# Patient Record
Sex: MALE | Race: WHITE | HISPANIC OR LATINO | Employment: FULL TIME | URBAN - METROPOLITAN AREA
[De-identification: names, ages, dates, MRNs, and addresses within clinical notes are randomized per-mention and may not be internally consistent; named-entity substitution may affect disease eponyms.]

---

## 2021-04-15 ENCOUNTER — OFFICE VISIT (OUTPATIENT)
Dept: FAMILY MEDICINE CLINIC | Facility: CLINIC | Age: 56
End: 2021-04-15
Payer: COMMERCIAL

## 2021-04-15 ENCOUNTER — TELEPHONE (OUTPATIENT)
Dept: ADMINISTRATIVE | Facility: OTHER | Age: 56
End: 2021-04-15

## 2021-04-15 VITALS
HEART RATE: 76 BPM | HEIGHT: 66 IN | BODY MASS INDEX: 29.06 KG/M2 | SYSTOLIC BLOOD PRESSURE: 120 MMHG | TEMPERATURE: 97.9 F | OXYGEN SATURATION: 98 % | WEIGHT: 180.8 LBS | DIASTOLIC BLOOD PRESSURE: 82 MMHG

## 2021-04-15 DIAGNOSIS — Z00.00 HEALTH MAINTENANCE EXAMINATION: Primary | ICD-10-CM

## 2021-04-15 DIAGNOSIS — E66.3 OVERWEIGHT (BMI 25.0-29.9): ICD-10-CM

## 2021-04-15 DIAGNOSIS — Z11.4 ENCOUNTER FOR SCREENING FOR HIV: ICD-10-CM

## 2021-04-15 PROCEDURE — 3008F BODY MASS INDEX DOCD: CPT | Performed by: FAMILY MEDICINE

## 2021-04-15 PROCEDURE — 99386 PREV VISIT NEW AGE 40-64: CPT | Performed by: FAMILY MEDICINE

## 2021-04-15 PROCEDURE — 1036F TOBACCO NON-USER: CPT | Performed by: FAMILY MEDICINE

## 2021-04-15 PROCEDURE — 3725F SCREEN DEPRESSION PERFORMED: CPT | Performed by: FAMILY MEDICINE

## 2021-04-15 RX ORDER — IBUPROFEN 600 MG/1
600 TABLET ORAL EVERY 8 HOURS PRN
COMMUNITY
Start: 2020-11-24 | End: 2021-12-20

## 2021-04-15 NOTE — PATIENT INSTRUCTIONS
Excellent health  No risk factors for heart disease  Will screen fasting sugar and HIV  Suggest getting COVID vaccine when available to him  At some point after that, recommend the shingles vaccine which is 2 shots  Recheck 1 year or as needed

## 2021-04-15 NOTE — TELEPHONE ENCOUNTER
----- Message from Florian Diaz, George Blair sent at 4/15/2021  1:20 PM EDT -----  Regarding: care gap request  04/15/21 1:20 PM    Hello, our patient Sadie Harrison has had CRC: Colonoscopy completed/performed  Please assist in updating the patient chart by pulling the Care Everywhere (CE) document  The date of service is 11/2019       Thank you,  Florian Diaz MA   [facility]

## 2021-04-15 NOTE — PROGRESS NOTES
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  Past Medical History:   Diagnosis Date    Renal colic 13/98/8047    x 2        History reviewed  No pertinent surgical history  Social History     Tobacco Use    Smoking status: Never Smoker    Smokeless tobacco: Never Used   Substance Use Topics    Alcohol use: Yes     Frequency: Monthly or less       Social History     Social History Narrative     since 89  Is a   Wife works as a registrar at [employer]     5 kids  Siobhan Cobbs 30, Working for [employer]  Lives in [facility],   Teacher  Grad of [employer]  Lives in [place]  2 daughters  Moving to [place]   is a   Alexia 29  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  Lives in [facility] in 11th grade  As of 21  [employer] 11  5TH grade  Father, 80, lives with them  He is on dialysis  Doing OK  Enjoys bike riding, camping, fishing, boating  Father  at 76 from [address] in 3/20  The following portions of the patient's history were reviewed and updated as appropriate: allergies, current medications, past family history, past medical history, past social history, past surgical history and problem list       Review of Systems   Constitutional: Negative for activity change and appetite change  HENT: Negative for ear pain and hearing loss  Eyes: Negative for visual disturbance     Respiratory: Negative for shortness of breath and wheezing  Cardiovascular: Negative for chest pain and leg swelling  Gastrointestinal: Negative for abdominal pain, constipation and diarrhea  Genitourinary: Negative for difficulty urinating  Musculoskeletal: Negative for arthralgias and back pain  Skin: Negative for rash  Neurological: Negative for headaches  Psychiatric/Behavioral: Negative for dysphoric mood  The patient is not nervous/anxious  /82 (BP Location: Left arm, Patient Position: Sitting, Cuff Size: Adult)   Pulse 76   Temp 97 9 °F (36 6 °C) (Temporal)   Ht 5' 5 75" (1 67 m)   Wt 82 kg (180 lb 12 8 oz)   SpO2 98%   BMI 29 41 kg/m²      Physical Exam     Healthy appearing individual in no acute distress  Extraocular motions are intact  Both ear drums are white  Hearing is grossly intact  Throat reveals no erythema  Teeth are in good repair  No neck nodes or thyromegaly  Lungs are clear  Heart regular with no murmurs or gallops  Abdomen is soft and nontender  No leg edema  Skin reveals no apparent rash  Neurologic grossly within normal limits  Normal mood and affect  Musculoskeletal exam grossly within normal limits  BMI Counseling: Body mass index is 29 41 kg/m²  The BMI is above normal  Nutrition recommendations include encouraging healthy choices of fruits and vegetables, consuming healthier snacks and limiting drinks that contain sugar  Exercise recommendations include moderate physical activity 150 minutes/week  Current Outpatient Medications:     ibuprofen (MOTRIN) 600 mg tablet, Take 600 mg by mouth every 8 (eight) hours as needed, Disp: , Rfl:      No problem-specific Assessment & Plan notes found for this encounter  Diagnoses and all orders for this visit:    Health maintenance examination    Overweight (BMI 25 0-29 9)  -     Glucose, fasting;  Future    Encounter for screening for HIV  -     HIV 1/2 Antigen/Antibody (4th Generation) w Reflex RUSTYUHN; Future    Other orders  -     ibuprofen (MOTRIN) 600 mg tablet; Take 600 mg by mouth every 8 (eight) hours as needed        Patient Instructions     Excellent health  No risk factors for heart disease  Will screen fasting sugar and HIV  Suggest getting COVID vaccine when available to him  At some point after that, recommend the shingles vaccine which is 2 shots  Recheck 1 year or as needed

## 2021-04-15 NOTE — TELEPHONE ENCOUNTER
----- Message from Jessica Nicole MD sent at 4/15/2021 11:07 AM EDT -----    Please abstract colonoscopy from [facility] data -done in 11/19

## 2021-04-16 NOTE — TELEPHONE ENCOUNTER
Upon review of the In Basket request we were able to locate, review, and update the patient chart as requested for CRC: Colonoscopy  Any additional questions or concerns should be emailed to the Practice Liaisons via [email or web address] email, please do not reply via In Basket      Thank you  Blayne Gross

## 2021-04-23 ENCOUNTER — APPOINTMENT (OUTPATIENT)
Dept: LAB | Facility: CLINIC | Age: 56
End: 2021-04-23
Payer: COMMERCIAL

## 2021-04-23 DIAGNOSIS — R73.03 PREDIABETES: Primary | ICD-10-CM

## 2021-04-23 DIAGNOSIS — E66.3 OVERWEIGHT (BMI 25.0-29.9): ICD-10-CM

## 2021-04-23 DIAGNOSIS — Z11.4 ENCOUNTER FOR SCREENING FOR HIV: ICD-10-CM

## 2021-04-23 LAB — GLUCOSE P FAST SERPL-MCNC: 110 MG/DL (ref 65–99)

## 2021-04-23 PROCEDURE — 87389 HIV-1 AG W/HIV-1&-2 AB AG IA: CPT

## 2021-04-23 PROCEDURE — 82947 ASSAY GLUCOSE BLOOD QUANT: CPT

## 2021-04-23 PROCEDURE — 36415 COLL VENOUS BLD VENIPUNCTURE: CPT

## 2021-04-25 LAB — HIV 1+2 AB+HIV1 P24 AG SERPL QL IA: NORMAL

## 2021-04-27 ENCOUNTER — LAB (OUTPATIENT)
Dept: LAB | Facility: CLINIC | Age: 56
End: 2021-04-27
Payer: COMMERCIAL

## 2021-04-27 ENCOUNTER — IMMUNIZATIONS (OUTPATIENT)
Dept: FAMILY MEDICINE CLINIC | Facility: HOSPITAL | Age: 56
End: 2021-04-27
Payer: COMMERCIAL

## 2021-04-27 DIAGNOSIS — Z23 ENCOUNTER FOR IMMUNIZATION: Primary | ICD-10-CM

## 2021-04-27 DIAGNOSIS — R73.03 PREDIABETES: ICD-10-CM

## 2021-04-27 LAB
EST. AVERAGE GLUCOSE BLD GHB EST-MCNC: 117 MG/DL
HBA1C MFR BLD: 5.7 %

## 2021-04-27 PROCEDURE — 91301 SARS-COV-2 / COVID-19 MRNA VACCINE (MODERNA) 100 MCG: CPT

## 2021-04-27 PROCEDURE — 0011A SARS-COV-2 / COVID-19 MRNA VACCINE (MODERNA) 100 MCG: CPT

## 2021-04-27 PROCEDURE — 36415 COLL VENOUS BLD VENIPUNCTURE: CPT

## 2021-04-27 PROCEDURE — 83036 HEMOGLOBIN GLYCOSYLATED A1C: CPT

## 2021-05-25 ENCOUNTER — IMMUNIZATIONS (OUTPATIENT)
Dept: FAMILY MEDICINE CLINIC | Facility: HOSPITAL | Age: 56
End: 2021-05-25

## 2021-05-25 DIAGNOSIS — Z23 ENCOUNTER FOR IMMUNIZATION: Primary | ICD-10-CM

## 2021-05-25 PROCEDURE — 0012A SARS-COV-2 / COVID-19 MRNA VACCINE (MODERNA) 100 MCG: CPT

## 2021-05-25 PROCEDURE — 91301 SARS-COV-2 / COVID-19 MRNA VACCINE (MODERNA) 100 MCG: CPT

## 2021-06-25 ENCOUNTER — TELEPHONE (OUTPATIENT)
Dept: FAMILY MEDICINE CLINIC | Facility: CLINIC | Age: 56
End: 2021-06-25

## 2021-06-25 NOTE — TELEPHONE ENCOUNTER
Fernando called with complaints of lower right side back pain that started 2-3 days ago  He said yesterday it was a 9 and then got better but today he can barely move  The pain also moves into his hip  I offered him an appointment but he said he is at work and scheduled for Monday  Please advise

## 2021-06-29 ENCOUNTER — OFFICE VISIT (OUTPATIENT)
Dept: FAMILY MEDICINE CLINIC | Facility: CLINIC | Age: 56
End: 2021-06-29
Payer: COMMERCIAL

## 2021-06-29 VITALS
BODY MASS INDEX: 28.25 KG/M2 | WEIGHT: 180 LBS | HEART RATE: 60 BPM | TEMPERATURE: 97.2 F | DIASTOLIC BLOOD PRESSURE: 74 MMHG | HEIGHT: 67 IN | OXYGEN SATURATION: 99 % | SYSTOLIC BLOOD PRESSURE: 120 MMHG | RESPIRATION RATE: 16 BRPM

## 2021-06-29 DIAGNOSIS — M54.50 ACUTE RIGHT-SIDED LOW BACK PAIN WITHOUT SCIATICA: Primary | ICD-10-CM

## 2021-06-29 PROCEDURE — 99213 OFFICE O/P EST LOW 20 MIN: CPT | Performed by: FAMILY MEDICINE

## 2021-06-29 PROCEDURE — 3008F BODY MASS INDEX DOCD: CPT | Performed by: FAMILY MEDICINE

## 2021-06-29 PROCEDURE — 1036F TOBACCO NON-USER: CPT | Performed by: FAMILY MEDICINE

## 2021-06-29 NOTE — PROGRESS NOTES
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  Past Medical History:   Diagnosis Date    Renal colic 39/11/6063    x 2        History reviewed  No pertinent surgical history  Social History     Tobacco Use    Smoking status: Never Smoker    Smokeless tobacco: Never Used   Substance Use Topics    Alcohol use: Yes       Social History     Social History Narrative     since 89  Is a   Wife works as a registrar at [employer]     5 kids  ZeeVee 28, Working for [employer]  Lives in [facility] [place],   Teacher  Grad of [employer]  Lives in [place]  2 daughters  Moving to [place]   is a   Alexia 29  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  Lives in [facility] in 11th grade  As of 21  [employer] 11  5TH grade  Father, 80, lives with them  He is on dialysis  Doing OK  Enjoys bike riding, camping, fishing, boating  Father  at 76 from [address] in 3/20  The following portions of the patient's history were reviewed and updated as appropriate: allergies, current medications, past family history, past medical history, past social history, past surgical history and problem list       Review of Systems   Constitutional: Negative for activity change and appetite change  HENT: Negative for ear pain and hearing loss  Eyes: Negative for visual disturbance     Respiratory: Negative for shortness of breath and wheezing  Cardiovascular: Negative for chest pain and leg swelling  Gastrointestinal: Negative for abdominal pain, constipation and diarrhea  Genitourinary: Negative for difficulty urinating  Musculoskeletal: Positive for back pain  Negative for arthralgias  Skin: Negative for rash  Neurological: Negative for headaches  Psychiatric/Behavioral: Negative for dysphoric mood  The patient is not nervous/anxious  /74   Pulse 60   Temp (!) 97 2 °F (36 2 °C) (Temporal)   Resp 16   Ht 5' 6 54" (1 69 m)   Wt 81 6 kg (180 lb)   SpO2 99%   BMI 28 59 kg/m²      Physical Exam   Appears well  There is no palpable tenderness on his low back or over the pelvic girdle  The area of discomfort is in the right buttock area  No tenderness over the right greater trochanter  Lays down and sits up without difficulty  Straight leg raising is negative although raising the left leg reproduces some discomfort in the right pelvic girdle  Sensation intact to light touch  Current Outpatient Medications:     ibuprofen (MOTRIN) 600 mg tablet, Take 600 mg by mouth every 8 (eight) hours as needed, Disp: , Rfl:      No problem-specific Assessment & Plan notes found for this encounter  Diagnoses and all orders for this visit:    Acute right-sided low back pain without sciatica        Patient Instructions   Fortunately, his about 40-50% improved  Continue ibuprofen 600 mg 3 times a day along with 2 extra-strength Tylenol  He is able to continue working  Return as needed

## 2021-06-29 NOTE — PATIENT INSTRUCTIONS
Fortunately, his about 40-50% improved  Continue ibuprofen 600 mg 3 times a day along with 2 extra-strength Tylenol  He is able to continue working  Return as needed

## 2021-11-09 ENCOUNTER — OFFICE VISIT (OUTPATIENT)
Dept: FAMILY MEDICINE CLINIC | Facility: CLINIC | Age: 56
End: 2021-11-09
Payer: COMMERCIAL

## 2021-11-09 VITALS
HEART RATE: 56 BPM | WEIGHT: 180 LBS | OXYGEN SATURATION: 98 % | DIASTOLIC BLOOD PRESSURE: 80 MMHG | TEMPERATURE: 97.2 F | SYSTOLIC BLOOD PRESSURE: 100 MMHG | BODY MASS INDEX: 28.25 KG/M2 | HEIGHT: 67 IN

## 2021-11-09 DIAGNOSIS — M54.6 ACUTE RIGHT-SIDED THORACIC BACK PAIN: Primary | ICD-10-CM

## 2021-11-09 DIAGNOSIS — M54.50 ACUTE RIGHT-SIDED LOW BACK PAIN WITHOUT SCIATICA: ICD-10-CM

## 2021-11-09 DIAGNOSIS — M62.838 MUSCLE SPASM: ICD-10-CM

## 2021-11-09 LAB
SL AMB  POCT GLUCOSE, UA: NORMAL
SL AMB LEUKOCYTE ESTERASE,UA: NORMAL
SL AMB POCT BILIRUBIN,UA: NORMAL
SL AMB POCT BLOOD,UA: NORMAL
SL AMB POCT CLARITY,UA: CLEAR
SL AMB POCT COLOR,UA: YELLOW
SL AMB POCT KETONES,UA: NORMAL
SL AMB POCT NITRITE,UA: NORMAL
SL AMB POCT PH,UA: 7
SL AMB POCT SPECIFIC GRAVITY,UA: 1010
SL AMB POCT URINE PROTEIN: NORMAL
SL AMB POCT UROBILINOGEN: NORMAL

## 2021-11-09 PROCEDURE — 1036F TOBACCO NON-USER: CPT | Performed by: FAMILY MEDICINE

## 2021-11-09 PROCEDURE — 81002 URINALYSIS NONAUTO W/O SCOPE: CPT | Performed by: FAMILY MEDICINE

## 2021-11-09 PROCEDURE — 3008F BODY MASS INDEX DOCD: CPT | Performed by: FAMILY MEDICINE

## 2021-11-09 PROCEDURE — 99214 OFFICE O/P EST MOD 30 MIN: CPT | Performed by: FAMILY MEDICINE

## 2021-11-09 RX ORDER — METHOCARBAMOL 750 MG/1
750 TABLET, FILM COATED ORAL EVERY 8 HOURS SCHEDULED
Qty: 30 TABLET | Refills: 0 | Status: SHIPPED | OUTPATIENT
Start: 2021-11-09 | End: 2021-12-20 | Stop reason: SDUPTHER

## 2021-12-20 ENCOUNTER — OFFICE VISIT (OUTPATIENT)
Dept: FAMILY MEDICINE CLINIC | Facility: CLINIC | Age: 56
End: 2021-12-20
Payer: COMMERCIAL

## 2021-12-20 VITALS
HEIGHT: 67 IN | DIASTOLIC BLOOD PRESSURE: 82 MMHG | HEART RATE: 78 BPM | SYSTOLIC BLOOD PRESSURE: 120 MMHG | OXYGEN SATURATION: 98 % | TEMPERATURE: 97.1 F | WEIGHT: 181.6 LBS | BODY MASS INDEX: 28.5 KG/M2

## 2021-12-20 DIAGNOSIS — M62.838 MUSCLE SPASM: ICD-10-CM

## 2021-12-20 DIAGNOSIS — Z23 NEEDS FLU SHOT: ICD-10-CM

## 2021-12-20 DIAGNOSIS — M54.6 ACUTE RIGHT-SIDED THORACIC BACK PAIN: ICD-10-CM

## 2021-12-20 DIAGNOSIS — S39.012S STRAIN OF LUMBAR REGION, SEQUELA: Primary | ICD-10-CM

## 2021-12-20 DIAGNOSIS — M54.50 ACUTE RIGHT-SIDED LOW BACK PAIN WITHOUT SCIATICA: ICD-10-CM

## 2021-12-20 PROCEDURE — 99213 OFFICE O/P EST LOW 20 MIN: CPT | Performed by: FAMILY MEDICINE

## 2021-12-20 PROCEDURE — 3008F BODY MASS INDEX DOCD: CPT | Performed by: FAMILY MEDICINE

## 2021-12-20 PROCEDURE — 1036F TOBACCO NON-USER: CPT | Performed by: FAMILY MEDICINE

## 2021-12-20 PROCEDURE — 90682 RIV4 VACC RECOMBINANT DNA IM: CPT

## 2021-12-20 PROCEDURE — 90471 IMMUNIZATION ADMIN: CPT

## 2021-12-20 RX ORDER — IBUPROFEN 800 MG/1
800 TABLET ORAL EVERY 8 HOURS PRN
Qty: 90 TABLET | Refills: 2 | Status: SHIPPED | OUTPATIENT
Start: 2021-12-20 | End: 2022-04-05

## 2021-12-20 RX ORDER — METHOCARBAMOL 750 MG/1
750 TABLET, FILM COATED ORAL EVERY 8 HOURS SCHEDULED
Qty: 60 TABLET | Refills: 2 | Status: SHIPPED | OUTPATIENT
Start: 2021-12-20

## 2022-04-05 DIAGNOSIS — S39.012S STRAIN OF LUMBAR REGION, SEQUELA: ICD-10-CM

## 2022-04-05 RX ORDER — IBUPROFEN 800 MG/1
TABLET ORAL
Qty: 90 TABLET | Refills: 2 | Status: SHIPPED | OUTPATIENT
Start: 2022-04-05

## 2022-05-04 ENCOUNTER — TELEPHONE (OUTPATIENT)
Dept: FAMILY MEDICINE CLINIC | Facility: CLINIC | Age: 57
End: 2022-05-04

## 2022-05-04 NOTE — LETTER
May 5, 2022     Patient: Doretha Barraza  YOB: 1965  Date of Visit: 5/4/2022      To Whom it May Concern:    Doretha Barraza is under my professional care  Fernando called but not seen on 05/05/2022  Please excuse patient for 05/02/2022, 05/03/2022, and 05/04/2022  If you have any questions or concerns, please don't hesitate to call           Sincerely,          Niranjan Seals MD

## 2022-05-04 NOTE — TELEPHONE ENCOUNTER
Patient Fortunato Beckman phone: [phone]  Patient reports he had a negative COVID test, but his wife and daughter are positive  He is asking for a work note for quarantine time  He worked Monday, but needs a note for Tuesday, Wednesday and Thursday  Please advise if this is something we can provide?

## 2022-05-05 NOTE — TELEPHONE ENCOUNTER
Okay for note for work  For sore throat, he should take 2 or 3 Advil along with 2 extra-strength Tylenol 3 times a day and suck on cough drops

## 2022-05-05 NOTE — TELEPHONE ENCOUNTER
Spoke to patient this morning and he states that on Tuesday he started feeling sick today he woke up with a bad sore throat and he did a home covid test and it was positive he is asking for a work note and also what can he take for the sore throat he states it is really bad  Please advise are you ok if I creat work note for patient?

## 2022-08-18 ENCOUNTER — OFFICE VISIT (OUTPATIENT)
Dept: FAMILY MEDICINE CLINIC | Facility: CLINIC | Age: 57
End: 2022-08-18
Payer: COMMERCIAL

## 2022-08-18 VITALS
OXYGEN SATURATION: 96 % | WEIGHT: 180.8 LBS | HEIGHT: 67 IN | DIASTOLIC BLOOD PRESSURE: 90 MMHG | TEMPERATURE: 98.4 F | RESPIRATION RATE: 16 BRPM | HEART RATE: 61 BPM | BODY MASS INDEX: 28.38 KG/M2 | SYSTOLIC BLOOD PRESSURE: 130 MMHG

## 2022-08-18 DIAGNOSIS — G56.22 CUBITAL TUNNEL SYNDROME ON LEFT: Primary | ICD-10-CM

## 2022-08-18 PROCEDURE — 3725F SCREEN DEPRESSION PERFORMED: CPT | Performed by: FAMILY MEDICINE

## 2022-08-18 PROCEDURE — 99214 OFFICE O/P EST MOD 30 MIN: CPT | Performed by: FAMILY MEDICINE

## 2022-08-18 RX ORDER — PREGABALIN 50 MG/1
50 CAPSULE ORAL 3 TIMES DAILY
Qty: 90 CAPSULE | Refills: 2 | Status: SHIPPED | OUTPATIENT
Start: 2022-08-18

## 2022-08-18 NOTE — PATIENT INSTRUCTIONS
Suspect a pinched nerve at the elbow causing pain in his left 4th and 5th fingers  Obtain EMG to confirm the diagnosis of an ulnar nerve neuropathy  If that test is positive, he may need surgical decompression as his symptoms seem to be progressing over the last year  Meanwhile, can use 2 extra-strength Tylenol and 800 mg of ibuprofen 3 times a day as needed for pain  In addition, may use Lyrica 50 mg at bedtime if symptoms are keeping him up at night  The nighttime dose of Lyrica could be doubled if necessary  Follow-up after the results of the EMG are available

## 2022-08-18 NOTE — LETTER
August 18, 2022     Patient: Nikki Henry  YOB: 1965  Date of Visit: 8/18/2022      To Whom it May Concern:    Nikki Henry is under my professional care  Katharine Witt was seen in my office on 8/18/2022  Katharine Zepeda may return to work on 8/22  If you have any questions or concerns, please don't hesitate to call           Sincerely,          Jil Andujar MD        CC: No Recipients

## 2022-08-18 NOTE — PROGRESS NOTES
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  Past Medical History:   Diagnosis Date    Renal colic 24/02/0490    x 2        Past Surgical History:   Procedure Laterality Date    NO PAST SURGERIES         Social History     Tobacco Use    Smoking status: Never Smoker    Smokeless tobacco: Never Used   Substance Use Topics    Alcohol use: Yes       Social History     Social History Narrative     since 89  Is a   Wife worked as a registrar at [employer]   Quit  when her father became ill      5 kids  Karla Garcia 29, Working for [employer]  Lives in [facility] [place],   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  Lives in [facility] going to [employer]   [employer] 12  7TH grade  Enjoys bike riding, camping, fishing, boating  Father  at 76 from [address] in 3/20          The following portions of the patient's history were reviewed and updated as appropriate: allergies, current medications, past family history, past medical history, past social history, past surgical history and problem list       Review of Systems       /90 (BP Location: Right arm, Patient Position: Sitting, Cuff Size: Standard)   Pulse 61   Temp 98 4 °F (36 9 °C) (Temporal)   Resp 16   Ht 5' 6 54" (1 69 m)   Wt 82 kg (180 lb 12 8 oz)   SpO2 96%   BMI 28 71 kg/m²      Physical Exam   Exam of the left hand reveals no atrophy of the 3rd or 4th fingers  However, there is slight decrease sensation to light touch over both sides of the 4th finger  The 5th finger appears unaffected  Tinel sign at the left wrist is negative  However, Tinel sign at the elbow is positive reproducing tingling in the 4th and 5th fingers  Current Outpatient Medications:     ibuprofen (MOTRIN) 800 mg tablet, TAKE 1 TABLET BY MOUTH EVERY 8 HOURS AS NEEDED FOR MILD PAIN OR MODERATE PAIN, Disp: 90 tablet, Rfl: 2    pregabalin (LYRICA) 50 mg capsule, Take 1 capsule (50 mg total) by mouth 3 (three) times a day for nerve pain, Disp: 90 capsule, Rfl: 2     No problem-specific Assessment & Plan notes found for this encounter  Diagnoses and all orders for this visit:    Cubital tunnel syndrome on left  -     EMG 1 Limb; Future  -     pregabalin (LYRICA) 50 mg capsule; Take 1 capsule (50 mg total) by mouth 3 (three) times a day for nerve pain        Patient Instructions   Suspect a pinched nerve at the elbow causing pain in his left 4th and 5th fingers  Obtain EMG to confirm the diagnosis of an ulnar nerve neuropathy  If that test is positive, he may need surgical decompression as his symptoms seem to be progressing over the last year  Meanwhile, can use 2 extra-strength Tylenol and 800 mg of ibuprofen 3 times a day as needed for pain  In addition, may use Lyrica 50 mg at bedtime if symptoms are keeping him up at night  The nighttime dose of Lyrica could be doubled if necessary  Follow-up after the results of the EMG are available

## 2022-10-11 ENCOUNTER — PROCEDURE VISIT (OUTPATIENT)
Dept: NEUROLOGY | Facility: CLINIC | Age: 57
End: 2022-10-11
Payer: COMMERCIAL

## 2022-10-11 DIAGNOSIS — G56.22 CUBITAL TUNNEL SYNDROME ON LEFT: ICD-10-CM

## 2022-10-11 PROCEDURE — 95886 MUSC TEST DONE W/N TEST COMP: CPT | Performed by: PHYSICAL MEDICINE & REHABILITATION

## 2022-10-11 PROCEDURE — 95909 NRV CNDJ TST 5-6 STUDIES: CPT | Performed by: PHYSICAL MEDICINE & REHABILITATION

## 2022-10-11 NOTE — PROGRESS NOTES
EMG 1 Limb     Date/Time 10/11/2022 12:01 PM     Performed by  Brooke Gomez MD     Authorized by Walter Conway MD                [facility] AT [address]  [phone]    Electromyography & Nerve Conduction Studies Report          Full Name: Izabel Nieves Gender: Male  MRN: [id number] YOB: 1965      Visit Date: 10/11/2022 11:10 AM  Age: 56 Years  Examining Physician: Brooke Gomez MD   Referring Physician: DR Ranjit Amanda History: 53-year-old right-handed male presents with complaints of pain and numbness in the 3rd and 4th fingers that has been progressively worsening after injury when he fell on his left elbow  Subsequently the numbness improved but now he notices a pulsating sensation in those fingers  He denies any radicular symptoms  Patient is being evaluated for a focal neuropathy  TEMP 32      Sensory Nerve Conduction Study       Nerve / Sites Rec  Site Onset Lat Peak Lat  Amp Segments Distance Velocity     ms ms µV  cm m/s   L Median - Dig II (Antidromic)      Wrist Index 4 2 6 0 15 6 Wrist - Index 13 31      Ref  ?3 5 ? 20 0 Ref  ?50   L Ulnar - Dig V (Antidromic)      Wrist Dig V 2 0 2 5 25 6 Wrist - Dig V 11 56      Ref  ?3 1 ? 17 0 Ref  ?50   L Radial - Superficial (Antidromic)      Forearm Wrist 1 6 2 0 14 8 Forearm - Wrist 10 64      Ref  ?2 9 ? 15 0 Ref  ?50       Motor Nerve Conduction Study       Nerve / Sites Muscle Latency Ref  Amplitude Ref  Segments Distance Lat Diff Velocity Ref  ms ms mV mV  cm ms m/s m/s   L Median - APB      Wrist APB 9 5 ?4 4 3 5 ?4 0 Wrist - APB 7         Elbow APB 15 2  0 8  Elbow - Wrist 22 5 67 39 ?49   L Ulnar - ADM      Wrist ADM 2 3 ?3 3 11 0 ?6 0 Wrist - ADM 7         B  Elbow ADM 5 7  10 7  B  Elbow - Wrist 21 5 3 42 63 ?49      A  Elbow ADM 7 2  10 5  A  Elbow - B  Elbow 10 1 50 67 ?49       F Waves       Nerve F Latency Ref      ms ms   L Median - APB 34 3 ?31 0 L Ulnar - ADM 25 8 ?32 0       EMG Summary Table     Spontaneous MUAP Recruitment   Muscle Nerve Roots IA Fib PSW Fasc H F  Dur  Amp PPP Config Pattern   L  First dorsal interosseous Ulnar C8-T1 NL None None None None NL NL None NL NL   L  Deltoid Axillary C5-C6 NL None None None None NL NL None NL NL   L  Biceps brachii Musculocut  C5-C6 NL None None None None NL NL None NL NL   L  Triceps brachii Radial C6-C8 NL None None None None NL NL None NL NL   L  Pronator teres Median C6-C7 NL None None None None NL NL None NL NL   L  Abductor pollicis brevis Median K3-A0 NL None None None None Gr  Incr  Gr  Incr  Few NL Reduced   L  Cervical paraspinals (low)  - NL None None None None NL NL None NL NL                     Summary        Motor and sensory conduction studies were performed on the left median and ulnar nerves  The median motor terminal latency was prolonged with a low compound motor action potential amplitude and a slow conduction velocity across the wrist   The low motor amplitude of the median nerve at the elbow was secondary to poor tolerance  The ulnar compound motor action potential was normal     The left  median and ulnar F waves were normal     The left median sensory peak latency was prolonged with a low sensory action potential amplitude and a slow conduction velocity across the wrist   The superficial radial sensory peak latency was normal with a low sensory action potential amplitude and a normal conduction velocity across the wrist   The  ulnar sensory action potential was normal     Concentric needle EMG was performed in various distal and proximal muscles of the left upper extremity including APB, FDI, pronator teres, deltoid, biceps, triceps and low cervical paraspinal region  There  was no evidence of active denervation in any of the muscles tested  Moderate decreased recruitment of giant motor units was noted in the abductor pollicis brevis    The compound motor unit action potentials were of normal configuration with interference patterns being full or full for effort in the remaining muscles tested  Impression:      Abnormal study  There is electrophysiologic evidence of a:    1  Moderate median nerve compression neuropathy at the wrist with demyelinative and axonal changes, consistent with a diagnosis of carpal tunnel syndrome  2   There is no evidence of a cervical radiculopathy or ulnar neuropathy

## 2022-10-12 DIAGNOSIS — G56.02 CARPAL TUNNEL SYNDROME OF LEFT WRIST: Primary | ICD-10-CM

## 2022-10-12 NOTE — RESULT ENCOUNTER NOTE
EMG reveals compression of the median nerve compatible with carpal tunnel syndrome  Referral to Hand surgery for possible carpal tunnel release

## 2022-10-12 NOTE — TELEPHONE ENCOUNTER
Patient is being referred to a hand specialist  Please schedule accordingly      [address]   [phone]

## 2022-11-21 VITALS
WEIGHT: 182 LBS | HEIGHT: 67 IN | DIASTOLIC BLOOD PRESSURE: 81 MMHG | HEART RATE: 71 BPM | BODY MASS INDEX: 28.56 KG/M2 | SYSTOLIC BLOOD PRESSURE: 121 MMHG

## 2022-11-21 DIAGNOSIS — G56.02 CARPAL TUNNEL SYNDROME OF LEFT WRIST: ICD-10-CM

## 2022-11-21 NOTE — PROGRESS NOTES
ORTHOPAEDIC HAND, WRIST, AND ELBOW OFFICE  VISIT       ASSESSMENT/PLAN:      44-year-old male here for his left carpal tunnel syndrome lb left ulnar neuritis  EMG of the left upper extremity was reviewed noting carpal tunnel syndrome with no ulnar nerve involvement  Conservative treatments were discussed with the patient included nighttime splinting consistently for 6 weeks  Patient wished to proceed and a cock-up wrist brace was provided for him in the office today  We will follow up with the patient in 6 weeks for re-evaluation  NSAIDs as needed for discomfort    The patient verbalized understanding of exam findings and treatment plan  We engaged in the shared decision-making process and treatment options were discussed at length with the patient  Surgical and conservative management discussed today along with risks and benefits  Diagnoses and all orders for this visit:    Carpal tunnel syndrome of left wrist  -     Ambulatory Referral to Hand Surgery  -     Cock Up Wrist Splint        Follow Up:  Return in about 6 weeks (around 1/2/2023) for left CTS  To Do Next Visit:  Re-evaluation of current issue      General Discussions:  Carpal Tunnel Syndrome: The anatomy and physiology of carpal tunnel syndrome was discussed with the patient today  Increase pressure localized under the transverse carpal ligament can cause pain, numbness, tingling, or dysesthesias within the median nerve distribution as well as feelings of fatigue, clumsiness, or awkwardness  These symptoms typically occur at night and worse in the morning upon waking  Eventually, untreated carpal tunnel syndrome can result in weakness and permanent loss of muscle within the thenar compartment of the hand  Treatment options were discussed with the patient    Conservative treatment includes nocturnal resting splints to keep the nerve in a neutral position, ergonomic changes within the work or home environment, activity modification, and tendon gliding exercises  Vitamin B6 one tablet daily over the counter may helpful to reduce symptoms  Steroid injections within the carpal canal can help a majority of patients, however this is often self-limited in a majority of patients  Surgical intervention to divide the transverse carpal ligament typically results in a long-lasting relief of the patient's complaints, with the recurrence rate of less than 1%  Cubital Tunnel Syndrome: The anatomy and physiology of cubital tunnel syndrome were discussed with the patient today in the office  Typically, increased elbow flexion activities decrease blood flow within the intraneural spaces, resulting in a feeling of numbness, tingling, weakness, or clumsiness within the hand and fingers  Occasionally, anatomic structures such as medial elbow osteophytes, the medial head of the triceps, were subluxing ulnar nerve may result in increased pressure or aggravation at the cubital tunnel  Typical signs and symptoms usually include numbness and tingling within the ring and small finger, weakness with , and weakness with pinch  Conservative treatment and includes nocturnal bracing to keep the elbow in a semi-extended position, activity modification, therapy, and avoiding excessive elbow flexion activities  Vitamin B6 one tablet daily over the counter may helpful to reduce symptoms  A majority of patients typically respond to conservative treatment over a period of approximately 3-6 months  EMG/NCV testing of the ulnar nerve at the elbow is not as reliable as carpal tunnel syndrome  Surgical intervention in the form of in situ release of the ulnar nerve at the elbow or ulnar nerve transposition may be required in up to 20% of patients  ____________________________________________________________________________________________________________________________________________      CHIEF COMPLAINT:  Chief Complaint   Patient presents with   • Left Wrist - Pain       SUBJECTIVE:  Franklyn Hoff is a 56y o  year old RHD male who presents today for consultation for his left carpal tunnel syndrome referred by his PCP, Dr Lisa Potter  Patient reports that he had an injury where he fell onto the left elbow proximally 2 years ago noting he had increased numbness and tingling in the 3rd and 4th fingers  Patient has been treating with 2 extra-strength Tylenol along with ibuprofen 800 mg up to 3 times daily  He is also taking Lyrica 50 mg at bedtime  Patient is a mailman  Pain/symptom timing:  Worse during the day when active  Pain/symptom context:  Worse with activites and work  Pain/symptom modifying factors:  Rest makes better, activities make worse  Pain/symptom associated signs/symptoms: none    Prior treatment   · NSAIDsYes ibuprofen 800 mg  · Injections No   · Bracing/Orthotics No    Physical Therapy No     I have personally reviewed all the relevant PMH, PSH, SH, FH, Medications and allergies      PAST MEDICAL HISTORY:  Past Medical History:   Diagnosis Date   • Renal colic 14/07/1213    x 2       PAST SURGICAL HISTORY:  Past Surgical History:   Procedure Laterality Date   • NO PAST SURGERIES         FAMILY HISTORY:  Family History   Problem Relation Age of Onset   • Diabetes Mother    • Hypertension Father        SOCIAL HISTORY:  Social History     Tobacco Use   • Smoking status: Never   • Smokeless tobacco: Never   Vaping Use   • Vaping Use: Never used   Substance Use Topics   • Alcohol use:  Yes   • Drug use: Never       MEDICATIONS:    Current Outpatient Medications:   •  ibuprofen (MOTRIN) 800 mg tablet, TAKE 1 TABLET BY MOUTH EVERY 8 HOURS AS NEEDED FOR MILD PAIN OR MODERATE PAIN, Disp: 90 tablet, Rfl: 2  •  pregabalin (LYRICA) 50 mg capsule, Take 1 capsule (50 mg total) by mouth 3 (three) times a day for nerve pain (Patient not taking: Reported on 11/21/2022), Disp: 90 capsule, Rfl: 2    ALLERGIES:  No Known Allergies        REVIEW OF SYSTEMS:  Review of Systems   Constitutional: Negative for chills, fever and unexpected weight change  HENT: Negative for hearing loss, nosebleeds and sore throat  Eyes: Negative for pain, redness and visual disturbance  Respiratory: Negative for cough, shortness of breath and wheezing  Cardiovascular: Negative for chest pain, palpitations and leg swelling  Gastrointestinal: Negative for abdominal pain, nausea and vomiting  Endocrine: Negative for polydipsia and polyuria  Genitourinary: Negative for dysuria and hematuria  Skin: Negative for rash and wound  Neurological: Positive for numbness  Negative for dizziness, light-headedness and headaches  Psychiatric/Behavioral: Negative for decreased concentration, dysphoric mood and suicidal ideas  The patient is not nervous/anxious          VITALS:  Vitals:    11/21/22 1036   BP: 121/81   Pulse: 71       LABS:  HgA1c:   Lab Results   Component Value Date    HGBA1C 5 7 (H) 04/27/2021     BMP: No results found for: GLUCOSE, CALCIUM, NA, K, CO2, CL, BUN, CREATININE    _____________________________________________________  PHYSICAL EXAMINATION:  General: well developed and well nourished, alert, oriented times 3 and appears comfortable  Psychiatric: Normal  HEENT: Normocephalic, Atraumatic Trachea Midline, No torticollis  Pulmonary: No audible wheezing or respiratory distress   Abdomen/GI: Non tender, non distended   Cardiovascular: No pitting edema, 2+ radial pulse   Skin: No masses, erythema, lacerations, fluctation, ulcerations  Neurovascular: Sensation Intact to the Radial Nerve, Decreased Sensation to  the Median Nerve, Decreased Sensation to  the Ulnar Nerve, Motor Intact to the Median, Ulnar, Radial Nerve and Pulses Intact  Musculoskeletal: Normal, except as noted in detailed exam and in HPI  MUSCULOSKELETAL EXAMINATION:    Left Carpal Tunnel Exam:    Negative thenar atrophy  Positive phalen's test  Positive carpal tunnel compression  Negative tinels over median nerve at the wrist   Opposition strength 5/5  Abduction strength 5/5  Left Ulnar Nerve Exam:    Negative intrinsic atrophy  Negative  deformity at the elbow  Full range of motion with flexion and extension of the elbow  Positive ulnar nerve compression test at the elbow  Positive tinels over the ulnar nerve at the elbow  Negative cross finger test in the index and long fingers  ntrinsic strength 5/5   Left long finger: flexor tendon nodule  2 pt discrimination: Thumb-  L 5 mm, IF-  L 5 mm, MF-  L 5 mm, RF-  L 5 mm, SF-  L 5 mm   ___________________________________________________  STUDIES REVIEWED:  I have personally reviewed EMG of left upper extremity reviewed from 10/11/2022  which demonstrate moderate median nerve compression neuropathy at the left wrist with edema on to have an axonal changes consistent with carpal tunnel syndrome, no evidence of cervical radiculopathy or ulnar neuropathy          PROCEDURES PERFORMED:  Procedures  No Procedures performed today    _____________________________________________________      Eugena Living    I,:  Gilda Alvarado am acting as a scribe while in the presence of the attending physician :       I,:  Billy Murphy MD personally performed the services described in this documentation    as scribed in my presence :

## 2023-01-09 ENCOUNTER — OFFICE VISIT (OUTPATIENT)
Dept: OBGYN CLINIC | Facility: MEDICAL CENTER | Age: 58
End: 2023-01-09

## 2023-01-09 VITALS
HEART RATE: 62 BPM | WEIGHT: 182 LBS | SYSTOLIC BLOOD PRESSURE: 131 MMHG | BODY MASS INDEX: 28.9 KG/M2 | DIASTOLIC BLOOD PRESSURE: 83 MMHG

## 2023-01-09 DIAGNOSIS — G56.02 CARPAL TUNNEL SYNDROME OF LEFT WRIST: Primary | ICD-10-CM

## 2023-01-09 NOTE — PROGRESS NOTES
Hand Surgery History and Physical    01/09/23  11:12 AM  [id number]  Elizabeth Al      HPI:  Pt is a 55 yo male with left hand numbness and pain  He notes tingling a lot of the time in his left middle and ring fingers  He notes pain in the same fingers at times as well  He was started with night time wrist splinting at his last visit  He has not noted any significant improvement thus far  Past Medical History:   Diagnosis Date   • Renal colic 34/28/6299    x 2       Past Surgical History:   Procedure Laterality Date   • NO PAST SURGERIES         Family History   Problem Relation Age of Onset   • Diabetes Mother    • Hypertension Father        Review of Systems - General ROS: negative  Ophthalmic ROS: negative  ENT ROS: negative  Respiratory ROS: negative  Cardiovascular ROS: negative  Neurological ROS: negative  Dermatological ROS: negative    Vitals:    01/09/23 1059   BP: 131/83   Pulse: 62       Physical Examination:  General:  NAD  HEENT:  EOMI, perrla, normal ear morphology  Chest:  Unlabored breathing  Heart:  Regular pulse  Abd:  No distension  Extrem: LUE:  No atrophy, able to make a full fist complex, no edema, carpal tunnel compression test with tingling to his middle and ring fingers  Skin:  Dry, pink  Neuro:  AAOx3     Encounter Diagnosis   Name Primary? • Carpal tunnel syndrome of left wrist Yes     Return for OR        A/P:  Pt is a 55 yo male with left carpal tunnel syndrome on EMG from 10/11/22    -Discussed treatment options   -Recommend left carpal tunnel release   -Local   -Consent obtained   -Will schedule

## 2023-01-09 NOTE — H&P (VIEW-ONLY)
Hand Surgery History and Physical    01/09/23  11:12 AM  [id number]  Michelle Lopez      HPI:  Pt is a 55 yo male with left hand numbness and pain  He notes tingling a lot of the time in his left middle and ring fingers  He notes pain in the same fingers at times as well  He was started with night time wrist splinting at his last visit  He has not noted any significant improvement thus far  Past Medical History:   Diagnosis Date   • Renal colic 15/39/1635    x 2       Past Surgical History:   Procedure Laterality Date   • NO PAST SURGERIES         Family History   Problem Relation Age of Onset   • Diabetes Mother    • Hypertension Father        Review of Systems - General ROS: negative  Ophthalmic ROS: negative  ENT ROS: negative  Respiratory ROS: negative  Cardiovascular ROS: negative  Neurological ROS: negative  Dermatological ROS: negative    Vitals:    01/09/23 1059   BP: 131/83   Pulse: 62       Physical Examination:  General:  NAD  HEENT:  EOMI, perrla, normal ear morphology  Chest:  Unlabored breathing  Heart:  Regular pulse  Abd:  No distension  Extrem: LUE:  No atrophy, able to make a full fist complex, no edema, carpal tunnel compression test with tingling to his middle and ring fingers  Skin:  Dry, pink  Neuro:  AAOx3     Encounter Diagnosis   Name Primary? • Carpal tunnel syndrome of left wrist Yes     Return for OR        A/P:  Pt is a 55 yo male with left carpal tunnel syndrome on EMG from 10/11/22    -Discussed treatment options   -Recommend left carpal tunnel release   -Local   -Consent obtained   -Will schedule

## 2023-01-31 ENCOUNTER — TELEPHONE (OUTPATIENT)
Dept: OBGYN CLINIC | Facility: HOSPITAL | Age: 58
End: 2023-01-31

## 2023-01-31 NOTE — TELEPHONE ENCOUNTER
Caller: patient    Doctor: Susan Griffith    Reason for call: patient called in stating that he does want the anesthesia now   He had previously stated the local anesthesia but has changed his mind    Call back#:[phone]

## 2023-01-31 NOTE — TELEPHONE ENCOUNTER
OK for IV sedation from our standpoint  Looks like Friday cases have all been moved to OR instead of the minor procedure room

## 2023-02-03 ENCOUNTER — ANESTHESIA EVENT (OUTPATIENT)
Dept: PERIOP | Facility: HOSPITAL | Age: 58
End: 2023-02-03

## 2023-02-03 ENCOUNTER — ANESTHESIA (OUTPATIENT)
Dept: PERIOP | Facility: HOSPITAL | Age: 58
End: 2023-02-03

## 2023-02-03 ENCOUNTER — HOSPITAL ENCOUNTER (OUTPATIENT)
Facility: HOSPITAL | Age: 58
Setting detail: OUTPATIENT SURGERY
Discharge: HOME | End: 2023-02-03
Attending: SURGERY | Admitting: SURGERY

## 2023-02-03 VITALS
BODY MASS INDEX: 28.49 KG/M2 | RESPIRATION RATE: 16 BRPM | TEMPERATURE: 97.9 F | SYSTOLIC BLOOD PRESSURE: 118 MMHG | HEIGHT: 66 IN | HEART RATE: 57 BPM | DIASTOLIC BLOOD PRESSURE: 84 MMHG | WEIGHT: 177.25 LBS | OXYGEN SATURATION: 98 %

## 2023-02-03 DIAGNOSIS — Z48.89 AFTERCARE FOLLOWING SURGERY: Primary | ICD-10-CM

## 2023-02-03 RX ORDER — MIDAZOLAM HYDROCHLORIDE 2 MG/2ML
INJECTION, SOLUTION INTRAMUSCULAR; INTRAVENOUS AS NEEDED
Status: DISCONTINUED | OUTPATIENT
Start: 2023-02-03 | End: 2023-02-03

## 2023-02-03 RX ORDER — BUPIVACAINE HYDROCHLORIDE AND EPINEPHRINE 2.5; 5 MG/ML; UG/ML
INJECTION, SOLUTION EPIDURAL; INFILTRATION; INTRACAUDAL; PERINEURAL AS NEEDED
Status: DISCONTINUED | OUTPATIENT
Start: 2023-02-03 | End: 2023-02-03 | Stop reason: HOSPADM

## 2023-02-03 RX ORDER — OXYCODONE HYDROCHLORIDE 5 MG/1
5 TABLET ORAL EVERY 4 HOURS PRN
Status: DISCONTINUED | OUTPATIENT
Start: 2023-02-03 | End: 2023-02-03 | Stop reason: HOSPADM

## 2023-02-03 RX ORDER — FENTANYL CITRATE/PF 50 MCG/ML
25 SYRINGE (ML) INJECTION
Status: DISCONTINUED | OUTPATIENT
Start: 2023-02-03 | End: 2023-02-03 | Stop reason: HOSPADM

## 2023-02-03 RX ORDER — PROPOFOL 10 MG/ML
INJECTION, EMULSION INTRAVENOUS CONTINUOUS PRN
Status: DISCONTINUED | OUTPATIENT
Start: 2023-02-03 | End: 2023-02-03

## 2023-02-03 RX ORDER — ACETAMINOPHEN 325 MG/1
650 TABLET ORAL EVERY 6 HOURS PRN
Status: DISCONTINUED | OUTPATIENT
Start: 2023-02-03 | End: 2023-02-03 | Stop reason: HOSPADM

## 2023-02-03 RX ORDER — SODIUM CHLORIDE 9 MG/ML
125 INJECTION, SOLUTION INTRAVENOUS CONTINUOUS
Status: DISCONTINUED | OUTPATIENT
Start: 2023-02-03 | End: 2023-02-03 | Stop reason: HOSPADM

## 2023-02-03 RX ORDER — ONDANSETRON 2 MG/ML
4 INJECTION INTRAMUSCULAR; INTRAVENOUS ONCE AS NEEDED
Status: DISCONTINUED | OUTPATIENT
Start: 2023-02-03 | End: 2023-02-03 | Stop reason: HOSPADM

## 2023-02-03 RX ORDER — PROPOFOL 10 MG/ML
INJECTION, EMULSION INTRAVENOUS AS NEEDED
Status: DISCONTINUED | OUTPATIENT
Start: 2023-02-03 | End: 2023-02-03

## 2023-02-03 RX ORDER — LIDOCAINE HYDROCHLORIDE AND EPINEPHRINE 10; 10 MG/ML; UG/ML
INJECTION, SOLUTION INFILTRATION; PERINEURAL AS NEEDED
Status: DISCONTINUED | OUTPATIENT
Start: 2023-02-03 | End: 2023-02-03 | Stop reason: HOSPADM

## 2023-02-03 RX ORDER — ACETAMINOPHEN AND CODEINE PHOSPHATE 300; 30 MG/1; MG/1
1 TABLET ORAL EVERY 12 HOURS PRN
Qty: 5 TABLET | Refills: 0 | Status: SHIPPED | OUTPATIENT
Start: 2023-02-03

## 2023-02-03 RX ORDER — MAGNESIUM HYDROXIDE 1200 MG/15ML
LIQUID ORAL AS NEEDED
Status: DISCONTINUED | OUTPATIENT
Start: 2023-02-03 | End: 2023-02-03 | Stop reason: HOSPADM

## 2023-02-03 RX ADMIN — PROPOFOL 100 MG: 10 INJECTION, EMULSION INTRAVENOUS at 11:38

## 2023-02-03 RX ADMIN — MIDAZOLAM 2 MG: 1 INJECTION INTRAMUSCULAR; INTRAVENOUS at 11:32

## 2023-02-03 RX ADMIN — SODIUM CHLORIDE 125 ML/HR: 0.9 INJECTION, SOLUTION INTRAVENOUS at 10:09

## 2023-02-03 RX ADMIN — PROPOFOL 120 MCG/KG/MIN: 10 INJECTION, EMULSION INTRAVENOUS at 11:38

## 2023-02-03 NOTE — OP NOTE
OPERATIVE REPORT  PATIENT NAME: Silvia Damon    :  1965  MRN: [id number]  Pt Location: [facility] OR ROOM 06    SURGERY DATE: 2/3/2023    Surgeon(s) and Role:     * Markel Patel MD - Primary     * Rubén Zuñiga - Assisting    Preop Diagnosis:  Carpal tunnel syndrome of left wrist [G56 02]    Post-Op Diagnosis Codes:     * Carpal tunnel syndrome of left wrist [G56 02]    Procedure(s):  Left - CTR   LEFT    Specimen(s):  * No specimens in log *    Estimated Blood Loss:   Minimal    Drains:  * No LDAs found *    Anesthesia Type:   IV Sedation with Anesthesia    Operative Indications:  Carpal tunnel syndrome of left wrist [G56 02]      Operative Findings:  None    Complications:   None    Procedure and Technique:  The patient's left hand was cleansed with alcohol  A field block was performed using marcaine 0 25% with epinephrine and lidocaine 1% with epinephrine in a 50-50 mixture  The left upper extremity was prepped and draped in a sterile fashion  A longitudinal incision was made overlying the transverse carpal ligament in line with the ring finger in a flexed position  Sharp dissection was performed down to the level of the transverse carpal ligament  Mary Lou Don was used for counterretraction  The transverse carpal ligament was divided with a 15 blade scalpel distally, and tenotomy scissors proximally along with a portion of the distal antebrachial fascia  The wound was irrigated copiously with normal saline  The skin was approximated with 4-0 nylon in an interrupted horizontal mattress fashion  Xeroform was applied followed by gauze and an ace bandage over wrap  The patient was transferred to recovery room in stable condition  I was present for the entire procedure    Patient Disposition:  PACU     My Assistant was necessary throughout the procedure(s) for retraction and positioning      I understand that section 1842 (b)(7)(D) of the Social Security Act generally prohibits Medicare physician fee schedule payment for the services of assistants-at-surgery in [facility] hospitals when qualified residents are available to furnish such services  I certify that the services for which payment is claimed were medically necessary, and that no qualified resident was available to perform the services  I further understand that these services are subject to post-payment review by the Medicare carrier        SIGNATURE: Juanita Campoverde MD  DATE: February 3, 2023  TIME: 11:58 AM

## 2023-02-03 NOTE — DISCHARGE INSTR - AVS FIRST PAGE
Elevate hand above heart as much as possible to help pain and swelling  May use hand for simple tasks, but no heavy lifting or tight squeezing x 4 weeks  Keep operative bandage clean and dry  You may remove bandage in 4 days, just place a band-aid over incision  You are permitted to shower after 4 days with band-aid off  Perform simple finger motion exercises: opening & closing fingers 10 x every hr  Follow-up in the office per your scheduled post-op visit 2/20/2023

## 2023-02-03 NOTE — ANESTHESIA PREPROCEDURE EVALUATION
Procedure:  CTR , LEFT (Left: Wrist)    Relevant Problems   Other   (+) Renal colic (Resolved)        Physical Exam    Airway    Mallampati score: II  TM Distance: >3 FB  Neck ROM: full     Dental   No notable dental hx     Cardiovascular  Rhythm: regular, Rate: normal, Cardiovascular exam normal    Pulmonary  Pulmonary exam normal Breath sounds clear to auscultation,     Other Findings        Anesthesia Plan  ASA Score- 2     Anesthesia Type- IV sedation with anesthesia with ASA Monitors  Additional Monitors:   Airway Plan:     Comment: GA prn  Plan Factors-    Chart reviewed  Patient summary reviewed  Patient is not a current smoker  Patient not instructed to abstain from smoking on day of procedure  Patient did not smoke on day of surgery  Induction-     Postoperative Plan-     Informed Consent- Anesthetic plan and risks discussed with patient and spouse Karen Cardona

## 2023-02-03 NOTE — ANESTHESIA POSTPROCEDURE EVALUATION
Post-Op Assessment Note    CV Status:  Stable    Pain management: adequate     Mental Status:  Alert and awake   Hydration Status:  Euvolemic   PONV Controlled:  Controlled   Airway Patency:  Patent      Post Op Vitals Reviewed: Yes      Staff: Anesthesiologist         No notable events documented      BP      Temp      Pulse     Resp      SpO2      /84   Pulse 57   Temp 97 9 °F (36 6 °C) (Temporal)   Resp 16   Ht 5' 6" (1 676 m)   Wt 80 4 kg (177 lb 4 oz)   SpO2 98%   BMI 28 61 kg/m²

## 2023-02-03 NOTE — INTERVAL H&P NOTE
H&P reviewed  After examining the patient I find no changes in the patients condition since the H&P had been written      Vitals:    02/03/23 0940   BP: 131/83   Pulse: 58   Resp: 16   Temp: 98 °F (36 7 °C)   SpO2: 97%

## 2023-02-20 ENCOUNTER — OFFICE VISIT (OUTPATIENT)
Dept: OBGYN CLINIC | Facility: MEDICAL CENTER | Age: 58
End: 2023-02-20

## 2023-02-20 VITALS
DIASTOLIC BLOOD PRESSURE: 80 MMHG | HEART RATE: 67 BPM | HEIGHT: 66 IN | WEIGHT: 177 LBS | BODY MASS INDEX: 28.45 KG/M2 | SYSTOLIC BLOOD PRESSURE: 126 MMHG

## 2023-02-20 DIAGNOSIS — G56.02 CARPAL TUNNEL SYNDROME OF LEFT WRIST: Primary | ICD-10-CM

## 2023-02-20 NOTE — LETTER
February 20, 2023     Patient: Sae Abreu  YOB: 1965  Date of Visit: 2/20/2023      To Whom it May Concern:    Sae Abreu is under my professional care  Prasad  was seen in my office on 2/20/2023  Prasad Hurtado may return to work on 3/6/2023  If you have any questions or concerns, please don't hesitate to call           Sincerely,          Iron uDncan MD

## 2023-02-20 NOTE — PROGRESS NOTES
Assessment/Plan:  Patient ID: Alma Cruz 56 y o  male   Surgery: Ctr , Left - Left  Date of Surgery: 2/3/2023    Resume activities as tolerated and scar tissue massage  The patient can shower letting soapy water over incision, yet should not to submerge the incision, and then pat dry  May use skin moisturizer  Work note provided Return to work in 2 weeks  Advised pt that he may return sooner if he feels he can  Follow Up:  PRN          CHIEF COMPLAINT:  Chief Complaint   Patient presents with   • Left Wrist - Follow-up     Ctr 2/3/23         SUBJECTIVE:  Alma Cruz is a 56y o  year old male who presents for follow up after Ctr , Left - Left  Today patient has some shooting pain at times down into his hand at times   No pain at incision and he denies Numbness/tingling      PHYSICAL EXAMINATION:  General: well developed and well nourished, alert, oriented times 3 and appears comfortable  Psychiatric: Normal    MUSCULOSKELETAL EXAMINATION:  Incision: Clean, dry, intact  Surgery Site: normal, no evidence of infection   Range of Motion: As expected  Neurovascular status: Neuro intact, good cap refill  Activity Restrictions: No restrictions        STUDIES REVIEWED:  No Studies to review      PROCEDURES PERFORMED:  Procedures  No Procedures performed today    Scribe Attestation    I,:  Cydney Machado am acting as a scribe while in the presence of the attending physician :       I,:  Zachariah Paulino MD personally performed the services described in this documentation    as scribed in my presence :

## 2023-03-06 ENCOUNTER — OFFICE VISIT (OUTPATIENT)
Dept: FAMILY MEDICINE CLINIC | Facility: CLINIC | Age: 58
End: 2023-03-06

## 2023-03-06 VITALS
DIASTOLIC BLOOD PRESSURE: 82 MMHG | WEIGHT: 187.2 LBS | TEMPERATURE: 97.7 F | HEIGHT: 66 IN | BODY MASS INDEX: 30.08 KG/M2 | OXYGEN SATURATION: 97 % | HEART RATE: 84 BPM | SYSTOLIC BLOOD PRESSURE: 128 MMHG

## 2023-03-06 DIAGNOSIS — Z00.00 HEALTH MAINTENANCE EXAMINATION: Primary | ICD-10-CM

## 2023-03-06 DIAGNOSIS — E78.2 MIXED HYPERLIPIDEMIA: ICD-10-CM

## 2023-03-06 DIAGNOSIS — R73.03 PREDIABETES: ICD-10-CM

## 2023-03-06 NOTE — PATIENT INSTRUCTIONS
Overall, excellent health  Recheck A1c and lipid profile  Recommend getting a COVID booster  Recheck 1 year or as needed  Wellness Visit for Adults   AMBULATORY CARE:   A wellness visit  is when you see your healthcare provider to get screened for health problems  Your healthcare provider will also give you advice on how to stay healthy  Write down your questions so you remember to ask them  Ask your healthcare provider how often you should have a wellness visit  What happens at a wellness visit:  Your healthcare provider will ask about your health, and your family history of health problems  This includes high blood pressure, heart disease, and cancer  He or she will ask if you have symptoms that concern you, if you smoke, and about your mood  You may also be asked about your intake of medicines, supplements, food, and alcohol  Any of the following may be done: Your weight  will be checked  Your height may also be checked so your body mass index (BMI) can be calculated  Your BMI shows if you are at a healthy weight  Your blood pressure  and heart rate will be checked  Your temperature may also be checked  Blood and urine tests  may be done  Blood tests may be done to check your cholesterol levels  Abnormal cholesterol levels increase your risk for heart disease and stroke  You may also need a blood or urine test to check for diabetes if you are at increased risk  Urine tests may be done to look for signs of an infection or kidney disease  A physical exam  includes checking your heartbeat and lungs with a stethoscope  Your healthcare provider may also check your skin to look for sun damage  Screening tests  may be recommended  A screening test is done to check for diseases that may not cause symptoms  The screening tests you may need depend on your age, gender, family history, and lifestyle habits  For example, colorectal screening may be recommended if you are 48years old or older      Screening tests you need if you are a woman:   A Pap smear  is used to screen for cervical cancer  Pap smears are usually done every 3 to 5 years depending on your age  You may need them more often if you have had abnormal Pap smear test results in the past  Ask your healthcare provider how often you should have a Pap smear  A mammogram  is an x-ray of your breasts to screen for breast cancer  Experts recommend mammograms every 2 years starting at age 48 years  You may need a mammogram at age 52 years or younger if you have an increased risk for breast cancer  Talk to your healthcare provider about when you should start having mammograms and how often you need them  Vaccines you may need:   Get an influenza vaccine  every year  The influenza vaccine protects you from the flu  Several types of viruses cause the flu  The viruses change over time, so new vaccines are made each year  Get a tetanus-diphtheria (Td) booster vaccine  every 10 years  This vaccine protects you against tetanus and diphtheria  Tetanus is a severe infection that may cause painful muscle spasms and lockjaw  Diphtheria is a severe bacterial infection that causes a thick covering in the back of your mouth and throat  Get a human papillomavirus (HPV) vaccine  if you are female and aged 23 to 32 or male 23 to 24 and never received it  This vaccine protects you from HPV infection  HPV is the most common infection spread by sexual contact  HPV may also cause vaginal, penile, and anal cancers  Get a pneumococcal vaccine  if you are aged 72 years or older  The pneumococcal vaccine is an injection given to protect you from pneumococcal disease  Pneumococcal disease is an infection caused by pneumococcal bacteria  The infection may cause pneumonia, meningitis, or an ear infection  Get a shingles vaccine  if you are 60 or older, even if you have had shingles before  The shingles vaccine is an injection to protect you from the varicella-zoster virus  This is the same virus that causes chickenpox  Shingles is a painful rash that develops in people who had chickenpox or have been exposed to the virus  How to eat healthy:  My Plate is a model for planning healthy meals  It shows the types and amounts of foods that should go on your plate  Fruits and vegetables make up about half of your plate, and grains and protein make up the other half  A serving of dairy is included on the side of your plate  The amount of calories and serving sizes you need depends on your age, gender, weight, and height  Examples of healthy foods are listed below:  Eat a variety of vegetables  such as dark green, red, and orange vegetables  You can also include canned vegetables low in sodium (salt) and frozen vegetables without added butter or sauces  Eat a variety of fresh fruits , canned fruit in 100% juice, frozen fruit, and dried fruit  Include whole grains  At least half of the grains you eat should be whole grains  Examples include whole-wheat bread, wheat pasta, brown rice, and whole-grain cereals such as oatmeal     Eat a variety of protein foods such as seafood (fish and shellfish), lean meat, and poultry without skin (turkey and chicken)  Examples of lean meats include pork leg, shoulder, or tenderloin, and beef round, sirloin, tenderloin, and extra lean ground beef  Other protein foods include eggs and egg substitutes, beans, peas, soy products, nuts, and seeds  Choose low-fat dairy products such as skim or 1% milk or low-fat yogurt, cheese, and cottage cheese  Limit unhealthy fats  such as butter, hard margarine, and shortening  Exercise:  Exercise at least 30 minutes per day on most days of the week  Some examples of exercise include walking, biking, dancing, and swimming  You can also fit in more physical activity by taking the stairs instead of the elevator or parking farther away from stores  Include muscle strengthening activities 2 days each week  Regular exercise provides many health benefits  It helps you manage your weight, and decreases your risk for type 2 diabetes, heart disease, stroke, and high blood pressure  Exercise can also help improve your mood  Ask your healthcare provider about the best exercise plan for you  General health and safety guidelines:   Do not smoke  Nicotine and other chemicals in cigarettes and cigars can cause lung damage  Ask your healthcare provider for information if you currently smoke and need help to quit  E-cigarettes or smokeless tobacco still contain nicotine  Talk to your healthcare provider before you use these products  Limit alcohol  A drink of alcohol is 12 ounces of beer, 5 ounces of wine, or 1½ ounces of liquor  Lose weight, if needed  Being overweight increases your risk of certain health conditions  These include heart disease, high blood pressure, type 2 diabetes, and certain types of cancer  Protect your skin  Do not sunbathe or use tanning beds  Use sunscreen with a SPF 15 or higher  Apply sunscreen at least 15 minutes before you go outside  Reapply sunscreen every 2 hours  Wear protective clothing, hats, and sunglasses when you are outside  Drive safely  Always wear your seatbelt  Make sure everyone in your car wears a seatbelt  A seatbelt can save your life if you are in an accident  Do not use your cell phone when you are driving  This could distract you and cause an accident  Pull over if you need to make a call or send a text message  Practice safe sex  Use latex condoms if are sexually active and have more than one partner  Your healthcare provider may recommend screening tests for sexually transmitted infections (STIs)  Wear helmets, lifejackets, and protective gear  Always wear a helmet when you ride a bike or motorcycle, go skiing, or play sports that could cause a head injury  Wear protective equipment when you play sports   Wear a lifejacket when you are on a boat or doing water sports  © Copyright Stoneya Scot 2022 Information is for End User's use only and may not be sold, redistributed or otherwise used for commercial purposes  The above information is an  only  It is not intended as medical advice for individual conditions or treatments  Talk to your doctor, nurse or pharmacist before following any medical regimen to see if it is safe and effective for you

## 2023-03-06 NOTE — PROGRESS NOTES
Chief Complaint   Patient presents with   • Physical Exam        HPI   Here for annual physical exam   Doing well  Had release of his carpal tunnel done on the left hand about 5 weeks ago  A couple years ago, A1c was 5 7  Also had high triglycerides and low HDL  But LDL was low as well  Feels well  Bowels and bladder working okay  No erectile dysfunction  Past Medical History:   Diagnosis Date   • Prediabetes 788   • Renal colic 99/93/4466    x 2        Past Surgical History:   Procedure Laterality Date   • IL NEUROPLASTY &/TRANSPOS MEDIAN NRV CARPAL TUNNE Left 2023    Procedure: CTR , LEFT;  Surgeon: Carlos Eduardo Sims MD;  Location: [facility];  Service: Orthopedics       Social History     Tobacco Use   • Smoking status: Never   • Smokeless tobacco: Never   Substance Use Topics   • Alcohol use: Yes     Comment: socially       Social History     Social History Narrative     since 89  Is a   Wife worked as a registrar at [employer]   Quit  when her father became ill      5 kids  [employer] 29, Working for [email or web address]  Lives in [facility] [address],   Teacher  Grad of [employer]  Lives in [place]  2 daughters   is a   Alexia 31  Grad of [employer]  Lives in [facility] going to [employer]   [employer] 12  7TH grade  Enjoys bike riding, camping, fishing, boating  Father  at [address] from [address] in 3/20          The following portions of the patient's history were reviewed and updated as appropriate: allergies, current medications, past family history, past medical history, past social history, past surgical history and problem list       Review of Systems       /82 (BP Location: Left arm, Patient Position: Sitting, Cuff Size: Large)   Pulse 84   Temp 97 7 °F (36 5 °C) (Temporal)   Ht 5' 6" (1 676 m)   Wt 84 9 kg (187 lb 3 2 oz)   SpO2 97%   BMI 30 21 kg/m²      Physical Exam Healthy appearing individual in no acute distress  Extraocular motions are intact  Both ear drums are white  Hearing is grossly intact  Throat reveals no erythema  Teeth are in good repair  No neck nodes or thyromegaly  Lungs are clear  Heart regular with no murmurs or gallops  Abdomen is soft and nontender  No leg edema  Skin reveals no apparent rash  Neurologic grossly within normal limits  Normal mood and affect  Musculoskeletal exam grossly within normal limits  Current Outpatient Medications:   •  acetaminophen-codeine (TYLENOL #3) 300-30 mg per tablet, Take 1 tablet by mouth every 12 (twelve) hours as needed for moderate pain, Disp: 5 tablet, Rfl: 0  •  ibuprofen (MOTRIN) 800 mg tablet, TAKE 1 TABLET BY MOUTH EVERY 8 HOURS AS NEEDED FOR MILD PAIN OR MODERATE PAIN, Disp: 90 tablet, Rfl: 2     No problem-specific Assessment & Plan notes found for this encounter  Diagnoses and all orders for this visit:    Health maintenance examination    Mixed hyperlipidemia  -     Lipid panel; Future    Prediabetes  -     Hemoglobin A1C; Future        Patient Instructions     Overall, excellent health  Recheck A1c and lipid profile  Recommend getting a COVID booster  Recheck 1 year or as needed  Wellness Visit for Adults   AMBULATORY CARE:   A wellness visit  is when you see your healthcare provider to get screened for health problems  Your healthcare provider will also give you advice on how to stay healthy  Write down your questions so you remember to ask them  Ask your healthcare provider how often you should have a wellness visit  What happens at a wellness visit:  Your healthcare provider will ask about your health, and your family history of health problems  This includes high blood pressure, heart disease, and cancer  He or she will ask if you have symptoms that concern you, if you smoke, and about your mood   You may also be asked about your intake of medicines, supplements, food, and alcohol  Any of the following may be done:  • Your weight  will be checked  Your height may also be checked so your body mass index (BMI) can be calculated  Your BMI shows if you are at a healthy weight  • Your blood pressure  and heart rate will be checked  Your temperature may also be checked  • Blood and urine tests  may be done  Blood tests may be done to check your cholesterol levels  Abnormal cholesterol levels increase your risk for heart disease and stroke  You may also need a blood or urine test to check for diabetes if you are at increased risk  Urine tests may be done to look for signs of an infection or kidney disease  • A physical exam  includes checking your heartbeat and lungs with a stethoscope  Your healthcare provider may also check your skin to look for sun damage  • Screening tests  may be recommended  A screening test is done to check for diseases that may not cause symptoms  The screening tests you may need depend on your age, gender, family history, and lifestyle habits  For example, colorectal screening may be recommended if you are 48years old or older  Screening tests you need if you are a woman:   • A Pap smear  is used to screen for cervical cancer  Pap smears are usually done every 3 to 5 years depending on your age  You may need them more often if you have had abnormal Pap smear test results in the past  Ask your healthcare provider how often you should have a Pap smear  • A mammogram  is an x-ray of your breasts to screen for breast cancer  Experts recommend mammograms every 2 years starting at age 48 years  You may need a mammogram at age 52 years or younger if you have an increased risk for breast cancer  Talk to your healthcare provider about when you should start having mammograms and how often you need them  Vaccines you may need:   • Get an influenza vaccine  every year  The influenza vaccine protects you from the flu   Several types of viruses cause the flu  The viruses change over time, so new vaccines are made each year  • Get a tetanus-diphtheria (Td) booster vaccine  every 10 years  This vaccine protects you against tetanus and diphtheria  Tetanus is a severe infection that may cause painful muscle spasms and lockjaw  Diphtheria is a severe bacterial infection that causes a thick covering in the back of your mouth and throat  • Get a human papillomavirus (HPV) vaccine  if you are female and aged 23 to 32 or male 23 to 24 and never received it  This vaccine protects you from HPV infection  HPV is the most common infection spread by sexual contact  HPV may also cause vaginal, penile, and anal cancers  • Get a pneumococcal vaccine  if you are aged 72 years or older  The pneumococcal vaccine is an injection given to protect you from pneumococcal disease  Pneumococcal disease is an infection caused by pneumococcal bacteria  The infection may cause pneumonia, meningitis, or an ear infection  • Get a shingles vaccine  if you are 60 or older, even if you have had shingles before  The shingles vaccine is an injection to protect you from the varicella-zoster virus  This is the same virus that causes chickenpox  Shingles is a painful rash that develops in people who had chickenpox or have been exposed to the virus  How to eat healthy:  My Plate is a model for planning healthy meals  It shows the types and amounts of foods that should go on your plate  Fruits and vegetables make up about half of your plate, and grains and protein make up the other half  A serving of dairy is included on the side of your plate  The amount of calories and serving sizes you need depends on your age, gender, weight, and height  Examples of healthy foods are listed below:  • Eat a variety of vegetables  such as dark green, red, and orange vegetables   You can also include canned vegetables low in sodium (salt) and frozen vegetables without added butter or sauces  • Eat a variety of fresh fruits , canned fruit in 100% juice, frozen fruit, and dried fruit  • Include whole grains  At least half of the grains you eat should be whole grains  Examples include whole-wheat bread, wheat pasta, brown rice, and whole-grain cereals such as oatmeal     • Eat a variety of protein foods such as seafood (fish and shellfish), lean meat, and poultry without skin (turkey and chicken)  Examples of lean meats include pork leg, shoulder, or tenderloin, and beef round, sirloin, tenderloin, and extra lean ground beef  Other protein foods include eggs and egg substitutes, beans, peas, soy products, nuts, and seeds  • Choose low-fat dairy products such as skim or 1% milk or low-fat yogurt, cheese, and cottage cheese  • Limit unhealthy fats  such as butter, hard margarine, and shortening  Exercise:  Exercise at least 30 minutes per day on most days of the week  Some examples of exercise include walking, biking, dancing, and swimming  You can also fit in more physical activity by taking the stairs instead of the elevator or parking farther away from stores  Include muscle strengthening activities 2 days each week  Regular exercise provides many health benefits  It helps you manage your weight, and decreases your risk for type 2 diabetes, heart disease, stroke, and high blood pressure  Exercise can also help improve your mood  Ask your healthcare provider about the best exercise plan for you  General health and safety guidelines:   • Do not smoke  Nicotine and other chemicals in cigarettes and cigars can cause lung damage  Ask your healthcare provider for information if you currently smoke and need help to quit  E-cigarettes or smokeless tobacco still contain nicotine  Talk to your healthcare provider before you use these products  • Limit alcohol  A drink of alcohol is 12 ounces of beer, 5 ounces of wine, or 1½ ounces of liquor  • Lose weight, if needed    Being overweight increases your risk of certain health conditions  These include heart disease, high blood pressure, type 2 diabetes, and certain types of cancer  • Protect your skin  Do not sunbathe or use tanning beds  Use sunscreen with a SPF 15 or higher  Apply sunscreen at least 15 minutes before you go outside  Reapply sunscreen every 2 hours  Wear protective clothing, hats, and sunglasses when you are outside  • Drive safely  Always wear your seatbelt  Make sure everyone in your car wears a seatbelt  A seatbelt can save your life if you are in an accident  Do not use your cell phone when you are driving  This could distract you and cause an accident  Pull over if you need to make a call or send a text message  • Practice safe sex  Use latex condoms if are sexually active and have more than one partner  Your healthcare provider may recommend screening tests for sexually transmitted infections (STIs)  • Wear helmets, lifejackets, and protective gear  Always wear a helmet when you ride a bike or motorcycle, go skiing, or play sports that could cause a head injury  Wear protective equipment when you play sports  Wear a lifejacket when you are on a boat or doing water sports  © Copyright Valeri Lozada 2022 Information is for End User's use only and may not be sold, redistributed or otherwise used for commercial purposes  The above information is an  only  It is not intended as medical advice for individual conditions or treatments  Talk to your doctor, nurse or pharmacist before following any medical regimen to see if it is safe and effective for you

## 2023-03-14 ENCOUNTER — LAB (OUTPATIENT)
Dept: LAB | Facility: CLINIC | Age: 58
End: 2023-03-14

## 2023-03-14 DIAGNOSIS — E78.2 MIXED HYPERLIPIDEMIA: ICD-10-CM

## 2023-03-14 DIAGNOSIS — R73.03 PREDIABETES: ICD-10-CM

## 2023-03-14 LAB
CHOLEST SERPL-MCNC: 161 MG/DL
EST. AVERAGE GLUCOSE BLD GHB EST-MCNC: 126 MG/DL
HBA1C MFR BLD: 6 %
HDLC SERPL-MCNC: 37 MG/DL
LDLC SERPL CALC-MCNC: 101 MG/DL (ref 0–100)
NONHDLC SERPL-MCNC: 124 MG/DL
TRIGL SERPL-MCNC: 117 MG/DL

## 2023-12-15 ENCOUNTER — OFFICE VISIT (OUTPATIENT)
Dept: FAMILY MEDICINE CLINIC | Facility: CLINIC | Age: 58
End: 2023-12-15
Payer: COMMERCIAL

## 2023-12-15 VITALS
SYSTOLIC BLOOD PRESSURE: 130 MMHG | HEIGHT: 66 IN | DIASTOLIC BLOOD PRESSURE: 90 MMHG | HEART RATE: 56 BPM | WEIGHT: 184 LBS | BODY MASS INDEX: 29.57 KG/M2 | OXYGEN SATURATION: 97 % | TEMPERATURE: 98.2 F | RESPIRATION RATE: 16 BRPM

## 2023-12-15 DIAGNOSIS — M25.572 CHRONIC PAIN OF LEFT ANKLE: Primary | ICD-10-CM

## 2023-12-15 DIAGNOSIS — M25.561 ACUTE PAIN OF RIGHT KNEE: ICD-10-CM

## 2023-12-15 DIAGNOSIS — G89.29 CHRONIC PAIN OF LEFT ANKLE: Primary | ICD-10-CM

## 2023-12-15 PROCEDURE — 99213 OFFICE O/P EST LOW 20 MIN: CPT | Performed by: NURSE PRACTITIONER

## 2023-12-15 RX ORDER — MELOXICAM 15 MG/1
15 TABLET ORAL DAILY PRN
Qty: 30 TABLET | Refills: 1 | Status: SHIPPED | OUTPATIENT
Start: 2023-12-15

## 2023-12-15 NOTE — PROGRESS NOTES
FAMILY PRACTICE OFFICE VISIT       NAME: Donald Garcia  AGE: 66 y.o. SEX: male       : 1965        MRN: [id number]    Assessment and Plan   1. Chronic pain of left ankle  Chronic left ankle pain, after ankle fracture years ago. Increasing intensity and frequency of pain. Difficult to complete work due to pain. Will complete x-rays, and refer to orthopedics. Trial Meloxicam daily, and continue to use Tylenol as needed. -     XR ankle 3+ vw left; Future; Expected date: 12/15/2023  -     Ambulatory Referral to Orthopedic Surgery; Future  -     meloxicam (MOBIC) 15 mg tablet; Take 1 tablet (15 mg total) by mouth daily as needed for moderate pain    2. Acute pain of right knee  Knee pain, ? Arthritis, bakers cyst from overuse, depending on right leg more due to left ankle pain. Will complete x-ray. Will address left ankle pain first and if knee pain persists he will contact me. -     XR knee 3 vw right non injury; Future; Expected date: 12/15/2023  -     meloxicam (MOBIC) 15 mg tablet; Take 1 tablet (15 mg total) by mouth daily as needed for moderate pain         Chief Complaint     Chief Complaint   Patient presents with    Ankle Pain     Left ankle    Knee Pain     Rt knee       History of Present Illness     Donald Garcia is a 66year old male presenting today for left ankle and right knee pain. 10 years ago fractured left ankle. No surgery was required. Pain has been intermittent over the years, but hs increased in intensity and frequency. He is , works 10-12 hour days. Some days can barely walk due to pain. Pain is worse at the end of the work day. Pain is shooting pain, starting in heel and shooting up ankle--medial.     Now right knee has started into hurt, posterior knee pain started 4 days ago. No locking or giving out. Using icy hot, Bengay topical product. No swelling, redness. Taking Tylenol.             Review of Systems   Review of Systems   Constitutional: Negative. Musculoskeletal:  Positive for arthralgias. I have reviewed the patient's medical history in detail; there are no changes to the history as noted in the electronic medical record. Objective     Vitals:    12/15/23 1053 12/15/23 1118   BP: 120/90 130/90   Pulse: 56    Resp: 16    Temp: 98.2 °F (36.8 °C)    TempSrc: Temporal    SpO2: 97%    Weight: 83.5 kg (184 lb)    Height: 5' 6" (1.676 m)      Wt Readings from Last 3 Encounters:   12/15/23 83.5 kg (184 lb)   03/06/23 84.9 kg (187 lb 3.2 oz)   02/20/23 80.3 kg (177 lb)     Physical Exam  Vitals and nursing note reviewed. Constitutional:       General: He is not in acute distress. Appearance: Normal appearance. He is not ill-appearing. Cardiovascular:      Rate and Rhythm: Normal rate and regular rhythm. Heart sounds: No murmur heard. Pulmonary:      Effort: Pulmonary effort is normal. No respiratory distress. Breath sounds: Normal breath sounds. Musculoskeletal:      Right knee: Normal.      Left ankle: Normal.   Neurological:      Mental Status: He is alert. Psychiatric:         Mood and Affect: Mood normal.         Depression Screening and Follow-up Plan: Patient was screened for depression during today's encounter. They screened negative with a PHQ-2 score of 0. ALLERGIES:  No Known Allergies    Current Medications     Current Outpatient Medications   Medication Sig Dispense Refill    meloxicam (MOBIC) 15 mg tablet Take 1 tablet (15 mg total) by mouth daily as needed for moderate pain 30 tablet 1     No current facility-administered medications for this visit.          Health Maintenance     Health Maintenance   Topic Date Due    Hepatitis C Screening  Never done    BMI: Followup Plan  04/15/2022    Influenza Vaccine (1) 09/01/2023    COVID-19 Vaccine (3 - 2023-24 season) 09/01/2023    Annual Physical  03/06/2024    Depression Screening  12/15/2024    BMI: Adult  12/15/2024    DTaP,Tdap,and Td Vaccines (2 - Td or Tdap) 08/04/2026    Colorectal Cancer Screening  11/22/2029    HIV Screening  Completed    Pneumococcal Vaccine: Pediatrics (0 to 5 Years) and At-Risk Patients (6 to 59 Years)  Aged Out    HIB Vaccine  Aged Out    IPV Vaccine  Aged Out    Hepatitis A Vaccine  Aged Out    Meningococcal ACWY Vaccine  Aged Out    HPV Vaccine  Aged [employer] History   Administered Date(s) Administered    COVID-19 MODERNA VACC 0.5 ML IM 04/27/2021, 05/25/2021    INFLUENZA 10/01/2019, 11/05/2020, 12/20/2021    Influenza, recombinant, quadrivalent,injectable, preservative free 12/20/2021    Tdap 08/04/2016       MARQUITA Pa

## 2023-12-22 ENCOUNTER — TELEMEDICINE (OUTPATIENT)
Dept: FAMILY MEDICINE CLINIC | Facility: CLINIC | Age: 58
End: 2023-12-22
Payer: COMMERCIAL

## 2023-12-22 DIAGNOSIS — U07.1 COVID-19: Primary | ICD-10-CM

## 2023-12-22 PROCEDURE — 99213 OFFICE O/P EST LOW 20 MIN: CPT | Performed by: FAMILY MEDICINE

## 2023-12-22 NOTE — PROGRESS NOTES
COVID-19 Outpatient Progress Note    Assessment/Plan:    Problem List Items Addressed This Visit    None  Visit Diagnoses       COVID-19    -  Primary        Discussed treatment options with Paxlovid however patient declined at this time opting to continue supportive care.     Disposition:     Patient with asymptomatic/mild COVID-19: They were recommended to isolate for at least 5 days (day 0 is the day symptoms appeared or the date the specimen was collected for the positive test for people who are asymptomatic). If they are asymptomatic or symptoms are improving with no fevers in the past 24 hours, isolation may be ended followed by 5 days of wearing a high quality mask when around others to minimize risk of infecting others. They should wear a mask through day 10 and a test-based strategy may be used to remove a mask sooner.      Discussed symptom directed medication options with patient.     I have spent a total time of 8 minutes on the day of the encounter for this patient including risks and benefits of treatment options, instructions for management and patient and family education.         Encounter provider: Regina Pollock MD     Provider located at: [address]   [facility] [address]  [place] [address] [phone]     Recent Visits  No visits were found meeting these conditions.  Showing recent visits within past 7 days and meeting all other requirements  Today's Visits  Date Type Provider Dept   12/22/23 Telemedicine Regina Pollock MD [facility]   Showing today's visits and meeting all other requirements  Future Appointments  No visits were found meeting these conditions.  Showing future appointments within next 150 days and meeting all other requirements     This virtual check-in was done via [employer] and patient was informed that this is a secure, HIPAA-compliant platform. He agrees to proceed.    Patient agrees to participate in a  "virtual check in via telephone or video visit instead of presenting to the office to address urgent/immediate medical needs. Patient is aware this is a billable service. He acknowledged consent and understanding of privacy and security of the video platform. The patient has agreed to participate and understands they can discontinue the visit at any time.    After connecting through [employer], the patient was identified by name and date of birth. Fernando Zuniga was informed that this was a telemedicine visit and that the exam was being conducted confidentially over secure lines. My office door was closed. No one else was in the room. Fernando Zuniga acknowledged consent and understanding of privacy and security of the telemedicine visit. I informed the patient that I have reviewed his record in Epic and presented the opportunity for him to ask any questions regarding the visit today. The patient agreed to participate.     Verification of patient location:  Patient is located in the following state in which I hold an active license: [place]    Subjective:   Fernando Zuniga is a 58 y.o. male who has been screened for COVID-19. Symptom change since last report: improving. Patient's symptoms include fever, fatigue, nasal congestion, loss of taste, cough and myalgias. Patient denies anosmia, shortness of breath, nausea, vomiting and diarrhea.     - Date of symptom onset: 12/18/2023  - Date of positive COVID-19 test: 12/20/2023. Type of test: Home antigen.     COVID-19 vaccination status: Fully vaccinated (primary series)    Fernando has been staying home and has isolated themselves in his home. He is taking care to not share personal items and is cleaning all surfaces that are touched often, like counters, tabletops, and doorknobs using household cleaning sprays or wipes. He is wearing a mask when he leaves his room.     No results found for: \"SARSCOV2\", \"[id number]\", \"SARSCORONAVI\", \"CORONAVIRUSR\", \"SARSCOVAG\", \"SARSCOVAGH\"    Review of " Systems   Constitutional:  Positive for fatigue and fever.   HENT:  Positive for congestion.    Respiratory:  Positive for cough. Negative for shortness of breath.    Gastrointestinal:  Negative for diarrhea, nausea and vomiting.   Musculoskeletal:  Positive for myalgias.     Current Outpatient Medications on File Prior to Visit   Medication Sig    meloxicam (MOBIC) 15 mg tablet Take 1 tablet (15 mg total) by mouth daily as needed for moderate pain (Patient not taking: Reported on 12/22/2023)       Objective:    There were no vitals taken for this visit.       Physical Exam  Constitutional:       General: He is not in acute distress.     Appearance: He is not ill-appearing.   HENT:      Head: Normocephalic and atraumatic.   Eyes:      General:         Right eye: No discharge.         Left eye: No discharge.      Extraocular Movements: Extraocular movements intact.   Pulmonary:      Effort: No respiratory distress.   Neurological:      General: No focal deficit present.      Mental Status: He is alert.   Psychiatric:         Mood and Affect: Mood normal.         Behavior: Behavior normal.       Regina Pollock MD

## 2023-12-22 NOTE — LETTER
December 22, 2023    Patient: Fernando Zuniga  YOB: 1965      To whom it may concern:     Fernando Zuniga has tested positive for COVID-19 (Coronavirus). Please excuse him from work 12/18- 12/22. He may return to work on 12/26/2023, which is 5 days from illness onset (provided symptoms are improving) and 24 hours without fever. Upon return he must adhere to strict mask wearing for 5 days.    Sincerely,         Regina Pollock MD

## 2024-01-02 ENCOUNTER — OFFICE VISIT (OUTPATIENT)
Dept: OBGYN CLINIC | Facility: CLINIC | Age: 59
End: 2024-01-02

## 2024-01-02 ENCOUNTER — APPOINTMENT (OUTPATIENT)
Dept: RADIOLOGY | Facility: CLINIC | Age: 59
End: 2024-01-02
Payer: COMMERCIAL

## 2024-01-02 VITALS
WEIGHT: 180 LBS | HEIGHT: 66 IN | DIASTOLIC BLOOD PRESSURE: 88 MMHG | BODY MASS INDEX: 28.93 KG/M2 | SYSTOLIC BLOOD PRESSURE: 142 MMHG

## 2024-01-02 DIAGNOSIS — M25.572 CHRONIC PAIN OF LEFT ANKLE: Primary | ICD-10-CM

## 2024-01-02 DIAGNOSIS — G89.29 CHRONIC PAIN OF LEFT ANKLE: Primary | ICD-10-CM

## 2024-01-02 DIAGNOSIS — M25.572 CHRONIC PAIN OF LEFT ANKLE: ICD-10-CM

## 2024-01-02 DIAGNOSIS — M21.42 PES PLANUS OF LEFT FOOT: ICD-10-CM

## 2024-01-02 DIAGNOSIS — M76.822 POSTERIOR TIBIAL TENDON DYSFUNCTION (PTTD) OF LEFT LOWER EXTREMITY: ICD-10-CM

## 2024-01-02 DIAGNOSIS — G89.29 CHRONIC PAIN OF LEFT ANKLE: ICD-10-CM

## 2024-01-02 PROCEDURE — 73610 X-RAY EXAM OF ANKLE: CPT

## 2024-01-02 NOTE — PROGRESS NOTES
Orthopaedic Surgery - Office Note  Fernando Zuniga (58 y.o. male)   : 1965   MRN: [id number]  Encounter Date: 2024    Chief Complaint   Patient presents with    Left Ankle - Pain, Fracture     Injured at work  or , problems since, arthritis..works as a , walking causes increased pain         Assessment/Plan  Diagnoses and all orders for this visit:    Chronic pain of left ankle  -     XR ankle 3+ vw left; Future  -     Ambulatory Referral to Orthopedic Surgery  -     Ambulatory Referral to Podiatry; Future  -     Ambulatory Referral to Physical Therapy; Future    Posterior tibial tendon dysfunction (PTTD) of left lower extremity  -     Ambulatory Referral to Physical Therapy; Future    Pes planus of left foot  -     Ambulatory Referral to Physical Therapy; Future    The diagnosis as well as treatment options were reviewed with the patient in the office today.  I would recommend initial conservative treatment of physical therapy to work on the posterior tibial tendon to trying to improve strength and endurance.    I have recommended a shoe with a good arch support.  We discussed orthotics and we will hold on any being started at this time pending results of the therapy.    I would recommend follow-up in 3 to 4 weeks with the podiatry department if his symptoms have not improved with conservative care.  All question concerns were answered in the office today.     Return for Recheck with podiatry in 3 to 4 weeks.        History of Present Illness  This is a new patient with chronic left ankle pain.  He has had ongoing left ankle pain that is worsening.  He reports a history of fracturing the ankle in the past ( or ).  Activity increases his symptoms.  His PCP started him on oral Mobic.  He is employed as a .  Patient reports that since transferring from the [place] office to the [facility] he has had increased shifts of 10 to 12 hours on  and Monday which  "causes increased discomfort in his left medial ankle.  He locates the pain along the course of the posterior tibial tendon.  He denies any 1 specific injury.  He has not noticed any significant swelling.  He reports that after back-to-back long shifts it takes a day or 2 of favoring the ankle to get back to normal.  He reports that he has been favoring the left side and thinks it may be causing him to develop right knee pain.  No calf pain or paresthesias are reported.  He has not had any treatment.    Review of Systems  Pertinent items are noted in HPI.  All other systems were reviewed and are negative.    Physical Exam  /88 (BP Location: Left arm, Patient Position: Sitting, Cuff Size: Standard)   Ht 5' 6\" (1.676 m)   Wt 81.6 kg (180 lb)   BMI 29.05 kg/m²   Cons: Appears well.  No apparent distress.  Psych: Alert. Oriented x3.  Mood and affect normal.    On examination patient's left foot is noted to have mild pes planus.  He is tender to palpation along the course of the posterior tibial tendon.  He is nontender on the medial and lateral malleolus.  There is no soft tissue edema or ankle effusion noted.  He is nontender over the ATFL CFL and deltoid ligaments.  He has no tenderness at the Achilles.  He is nontender at the plantar fascia.  He is nontender at the fifth metatarsal.  He has no midfoot tenderness.  He has well-maintained ankle range of motion to dorsiflexion, plantarflexion, inversion, and eversion.  He has mild pain and weakness to plantarflexion and inversion.  He has no pain or weakness to dorsiflexion or eversion.  His gait is heel-to-toe.  He has no calf tenderness and a negative Homans.  There is no instability with anterior drawer.  Dorsalis pedis and posterior tibial pulses are +2    X-rays performed in the office today 3 views of the left ankle show no acute fractures or dislocations.  Ankle mortise remains intact.  Calcaneal ossification noted posteriorly without any clinical " coordination to point tenderness.  Possible old distal fibula fracture noted.  X-rays reviewed from orthopedic standpoint will await official radiologist interpretation               Studies Reviewed  PCP notes were reviewed for today's visit.      Procedures  No procedures today.    Medical, Surgical, Family, and Social History  The patient's medical history, family history, and social history, were reviewed and updated as appropriate.    Past Medical History:   Diagnosis Date    Prediabetes 3/6/2023    Renal colic 08/28/2013    x 2       Past Surgical History:   Procedure Laterality Date    [place] NEUROPLASTY &/TRANSPOS MEDIAN NRV CARPAL TUNNE Left 02/03/2023    Procedure: CTR , LEFT;  Surgeon: Sd Orellana MD;  Location: [facility];  Service: Orthopedics       Family History   Problem Relation Age of Onset    Diabetes Mother     Hypertension Father        Social History     Occupational History    Not on file   Tobacco Use    Smoking status: Never    Smokeless tobacco: Never   Vaping Use    Vaping status: Never Used   Substance and Sexual Activity    Alcohol use: Yes     Comment: socially    Drug use: Never    Sexual activity: Not on file       No Known Allergies      Current Outpatient Medications:     meloxicam (MOBIC) 15 mg tablet, Take 1 tablet (15 mg total) by mouth daily as needed for moderate pain (Patient not taking: Reported on 12/22/2023), Disp: 30 tablet, Rfl: 1      Gibran Burton PA-C

## 2024-01-10 ENCOUNTER — OFFICE VISIT (OUTPATIENT)
Dept: FAMILY MEDICINE CLINIC | Facility: CLINIC | Age: 59
End: 2024-01-10
Payer: COMMERCIAL

## 2024-01-10 VITALS
HEIGHT: 66 IN | DIASTOLIC BLOOD PRESSURE: 82 MMHG | RESPIRATION RATE: 16 BRPM | OXYGEN SATURATION: 95 % | TEMPERATURE: 98 F | WEIGHT: 188 LBS | HEART RATE: 66 BPM | BODY MASS INDEX: 30.22 KG/M2 | SYSTOLIC BLOOD PRESSURE: 118 MMHG

## 2024-01-10 DIAGNOSIS — J06.9 VIRAL UPPER RESPIRATORY TRACT INFECTION: Primary | ICD-10-CM

## 2024-01-10 PROBLEM — G89.29 CHRONIC PAIN OF LEFT ANKLE: Status: ACTIVE | Noted: 2024-01-10

## 2024-01-10 PROBLEM — M25.572 CHRONIC PAIN OF LEFT ANKLE: Status: ACTIVE | Noted: 2024-01-10

## 2024-01-10 PROCEDURE — 99213 OFFICE O/P EST LOW 20 MIN: CPT | Performed by: FAMILY MEDICINE

## 2024-01-10 RX ORDER — CODEINE PHOSPHATE/GUAIFENESIN 10-100MG/5
LIQUID (ML) ORAL
Qty: 240 ML | Refills: 0 | Status: SHIPPED | OUTPATIENT
Start: 2024-01-10

## 2024-01-10 NOTE — PROGRESS NOTES
Chief Complaint   Patient presents with    Cold Like Symptoms     Fever chills cough X 1 week. Negative covid test. Started feeling better yesterday         Fever - 9 weeks to 74 years   The current episode started yesterday. The problem occurs every several days. The temperature was taken using a tympanic thermometer. Associated symptoms include chest pain, congestion, coughing, headaches, muscle aches, sleepiness and a sore throat. Pertinent negatives include no abdominal pain, diarrhea, ear pain, nausea, rash, urinary pain, vomiting or wheezing.      Here with fevers, chills, and cough for 1 week.  Tested negative for COVID.  Started feeling better yesterday.  Missed work today and the 2 previous days.  Cough keeping him up at night.  Not sure if he had a flu shot this year.  Was seen by orthopedics.  Referred for physical therapy has been using meloxicam and ibuprofen.  Notes after a 10 or 12-hour day, he will be able to walk.    Past Medical History:   Diagnosis Date    Chronic pain of left ankle 01/10/2024    Prediabetes 03/06/2023    Renal colic 08/28/2013    x 2        Past Surgical History:   Procedure Laterality Date    [place] NEUROPLASTY &/TRANSPOS MEDIAN NRV CARPAL TUNNE Left 02/03/2023    Procedure: CTR , LEFT;  Surgeon: Sd Orellana MD;  [facility];  Service: Orthopedics       Social History     Tobacco Use    Smoking status: Never    Smokeless tobacco: Never   Substance Use Topics    Alcohol use: Yes     Comment: socially       Social History     Social History Narrative     since 5/7/91.      Is a .     Wife worked as a registrar at [employer] .Quit 8/21 when her father became ill.     5 kids.     Oldest, Aaron 34, Working for [employer].  Lives in [place].     Addie 33, . Teacher. Grad of [employer]. Lives in [place]  2 daughters.  is a .    Alexia 31. Grad of [employer]. [employer]. Lives in [place].    Fernando going to [employer] 9/22.     "[employer]ber[employer] 12. 7TH grade.         Enjoys bike riding, camping, fishing, boating.        Father  at 74 from Covid in 3/20.        The following portions of the patient's history were reviewed and updated as appropriate: allergies, current medications, past family history, past medical history, past social history, past surgical history, and problem list.      Review of Systems   Constitutional:  Positive for fever.   HENT:  Positive for congestion and sore throat. Negative for ear pain.    Respiratory:  Positive for cough. Negative for wheezing.    Cardiovascular:  Positive for chest pain.   Gastrointestinal:  Negative for abdominal pain, diarrhea, nausea and vomiting.   Genitourinary:  Negative for dysuria.   Skin:  Negative for rash.   Neurological:  Positive for headaches.          /82 (BP Location: Left arm, Patient Position: Sitting, Cuff Size: Large)   Pulse 66   Temp 98 °F (36.7 °C) (Temporal)   Resp 16   Ht 5' 6\" (1.676 m)   Wt 85.3 kg (188 lb)   SpO2 95%   BMI 30.34 kg/m²      Physical Exam   Appears comfortable.  Both eardrums are white.  Throat reveals no erythema.  Lungs are clear.  No wheezing or rhonchi.              Current Outpatient Medications:     guaifenesin-codeine (GUAIFENESIN AC) 100-10 MG/5ML liquid, 1-2 tsp every 4 hours as needed for cough, Disp: 240 mL, Rfl: 0    meloxicam (MOBIC) 15 mg tablet, Take 1 tablet (15 mg total) by mouth daily as needed for moderate pain, Disp: 30 tablet, Rfl: 1     No problem-specific Assessment & Plan notes found for this encounter.       Diagnoses and all orders for this visit:    Viral upper respiratory tract infection  -     guaifenesin-codeine (GUAIFENESIN AC) 100-10 MG/5ML liquid; 1-2 tsp every 4 hours as needed for cough        Patient Instructions   Viral bronchitis needs to run its course.  Possibly had the flu.  Robitussin with codeine 1 to 2 teaspoons every 4 hours at night as needed for cough.  Cough drops are helpful.  Advised not to " use meloxicam along with ibuprofen.  With meloxicam, okay to take 2 extra strength Tylenol 3 times a day.  Note given to return to work tomorrow.  Follow-up as needed or scheduled.

## 2024-01-10 NOTE — PATIENT INSTRUCTIONS
Viral bronchitis needs to run its course.  Possibly had the flu.  Robitussin with codeine 1 to 2 teaspoons every 4 hours at night as needed for cough.  Cough drops are helpful.  Advised not to use meloxicam along with ibuprofen.  With meloxicam, okay to take 2 extra strength Tylenol 3 times a day.  Note given to return to work tomorrow.  Follow-up as needed or scheduled.

## 2024-01-10 NOTE — LETTER
January 10, 2024     Patient: Fernando Zuniga  YOB: 1965  Date of Visit: 1/10/2024      To Whom it May Concern:    Fernando Zuniga is under my professional care. Fernando was seen in my office on 1/10/2024. Fernando. Please excuse from work 1/8-1/10.  May return to work on 1/11..    If you have any questions or concerns, please don't hesitate to call.         Sincerely,          Bobo Benz MD        CC: No Recipients

## 2024-02-05 ENCOUNTER — OFFICE VISIT (OUTPATIENT)
Dept: PODIATRY | Facility: CLINIC | Age: 59
End: 2024-02-05
Payer: COMMERCIAL

## 2024-02-05 ENCOUNTER — OFFICE VISIT (OUTPATIENT)
Dept: FAMILY MEDICINE CLINIC | Facility: CLINIC | Age: 59
End: 2024-02-05
Payer: COMMERCIAL

## 2024-02-05 VITALS
WEIGHT: 188 LBS | DIASTOLIC BLOOD PRESSURE: 80 MMHG | HEART RATE: 76 BPM | HEIGHT: 66 IN | BODY MASS INDEX: 30.22 KG/M2 | SYSTOLIC BLOOD PRESSURE: 125 MMHG

## 2024-02-05 VITALS
DIASTOLIC BLOOD PRESSURE: 84 MMHG | TEMPERATURE: 97.8 F | HEART RATE: 84 BPM | HEIGHT: 66 IN | BODY MASS INDEX: 29.57 KG/M2 | WEIGHT: 184 LBS | SYSTOLIC BLOOD PRESSURE: 132 MMHG | OXYGEN SATURATION: 97 %

## 2024-02-05 DIAGNOSIS — J06.9 VIRAL UPPER RESPIRATORY TRACT INFECTION: Primary | ICD-10-CM

## 2024-02-05 DIAGNOSIS — G89.29 CHRONIC PAIN OF LEFT ANKLE: ICD-10-CM

## 2024-02-05 DIAGNOSIS — J06.9 VIRAL UPPER RESPIRATORY TRACT INFECTION: ICD-10-CM

## 2024-02-05 DIAGNOSIS — M25.572 CHRONIC PAIN OF LEFT ANKLE: ICD-10-CM

## 2024-02-05 DIAGNOSIS — M19.072 OSTEOARTHRITIS OF LEFT ANKLE OR FOOT: Primary | ICD-10-CM

## 2024-02-05 PROCEDURE — 99202 OFFICE O/P NEW SF 15 MIN: CPT | Performed by: PODIATRIST

## 2024-02-05 PROCEDURE — 99213 OFFICE O/P EST LOW 20 MIN: CPT | Performed by: FAMILY MEDICINE

## 2024-02-05 RX ORDER — ALBUTEROL SULFATE 90 UG/1
2 AEROSOL, METERED RESPIRATORY (INHALATION) EVERY 4 HOURS PRN
Qty: 18 G | Refills: 5 | Status: SHIPPED | OUTPATIENT
Start: 2024-02-05

## 2024-02-05 RX ORDER — CODEINE PHOSPHATE/GUAIFENESIN 10-100MG/5
LIQUID (ML) ORAL
Qty: 240 ML | Refills: 0 | Status: SHIPPED | OUTPATIENT
Start: 2024-02-05

## 2024-02-05 RX ORDER — CODEINE PHOSPHATE/GUAIFENESIN 10-100MG/5
LIQUID (ML) ORAL
Qty: 240 ML | Refills: 0 | Status: SHIPPED | OUTPATIENT
Start: 2024-02-05 | End: 2024-02-05 | Stop reason: SDUPTHER

## 2024-02-05 NOTE — TELEPHONE ENCOUNTER
Change of pharmacy - [facility] did not have in stock    Reason for call:   [x] Refill   [] Prior Auth  [] Other:     Office:   [x] PCP/Provider -   [] Specialty/Provider -     Medication: guaifenesin-codeine (GUAIFENESIN AC) 100-10 MG/5ML liquid     Dose/Frequency: 1-2 tsp every 4 hours as needed for cough Strength: 100-10 MG/5ML     Quantity: 240mL    Pharmacy: [id number] - [place] - [id number]  [phone]     Does the patient have enough for 3 days?   [] Yes   [x] No - Send as HP to POD

## 2024-02-05 NOTE — PROGRESS NOTES
"Chief Complaint   Patient presents with    Fever    Nasal Congestion     And chest congestion-been taking otc meds, but nothing is helping    Fatigue        HPI   Here with congestion for about a week.  Started with fever.  Had COVID in December.  Unable to sleep for the last week.  Tried Tylenol, Mucinex, and other over-the-counter medications.  Feels he was wheezing but that is getting better.      Past Medical History:   Diagnosis Date    Chronic pain of left ankle 01/10/2024    Prediabetes 2023    Renal colic 08/28/2013    x 2        Past Surgical History:   Procedure Laterality Date    [place] NEUROPLASTY &/TRANSPOS MEDIAN NRV CARPAL TUNNE Left 2023    Procedure: CTR , LEFT;  Surgeon: Sd Orellana MD;  [facility];  Service: Orthopedics       Social History     Tobacco Use    Smoking status: Never    Smokeless tobacco: Never   Substance Use Topics    Alcohol use: Yes     Comment: socially       Social History     Social History Narrative     since 89.      Is a .     Wife worked as a registrar at [employer] .Quit  when her father became ill.     5 kids.     Oldest, Aaron 34, Working for [employer].  Lives in [place].     Addie 35, . Teacher. Grad of [employer]. Lives in [place]  2 daughters.  is a .    Alexia 33. Grad of [employer]. [employer]. Lives in [place].    Fernando going to [employer] .    [employer] 12. 7TH grade.         Enjoys bike riding, camping, fishing, boating.        Father  at 74 from Covid in 3/20.        The following portions of the patient's history were reviewed and updated as appropriate: allergies, current medications, past family history, past medical history, past social history, past surgical history, and problem list.      Review of Systems       /84 (BP Location: Left arm, Patient Position: Sitting, Cuff Size: Large)   Pulse 84   Temp 97.8 °F (36.6 °C) (Temporal)   Ht 5' 6\" (1.676 m)   Wt " 83.5 kg (184 lb)   SpO2 97%   BMI 29.70 kg/m²      Physical Exam   Looks washed out.  Eardrums are white.  Nasal passages are congested.  Throat shows no erythema.  Lungs reveal a couple scattered inspiratory wheezes.              Current Outpatient Medications:     albuterol (PROVENTIL HFA,VENTOLIN HFA) 90 mcg/act inhaler, Inhale 2 puffs every 4 (four) hours as needed for wheezing or shortness of breath, Disp: 18 g, Rfl: 5    guaifenesin-codeine (GUAIFENESIN AC) 100-10 MG/5ML liquid, 1-2 tsp every 4 hours as needed for cough, Disp: 240 mL, Rfl: 0    meloxicam (MOBIC) 15 mg tablet, Take 1 tablet (15 mg total) by mouth daily as needed for moderate pain, Disp: 30 tablet, Rfl: 1     No problem-specific Assessment & Plan notes found for this encounter.       Diagnoses and all orders for this visit:    Viral upper respiratory tract infection  -     guaifenesin-codeine (GUAIFENESIN AC) 100-10 MG/5ML liquid; 1-2 tsp every 4 hours as needed for cough  -     albuterol (PROVENTIL HFA,VENTOLIN HFA) 90 mcg/act inhaler; Inhale 2 puffs every 4 (four) hours as needed for wheezing or shortness of breath        Patient Instructions   Has viral symptoms which need to run their course.  Albuterol 2 puffs every 4 hours as needed for wheezing.  Robitussin with codeine 1 or 2 teaspoons every 4 hours, especially at night, for cough.  From behind the counter pseudoephedrine 30 mg, 1 or 2 every 4 hours for nasal congestion.  Can also get Afrin or drugstore brand decongestant nose spray to use twice a day for up to 5 days.  In addition, 2 extra strength Tylenol 3 times a day.  Follow-up as needed.

## 2024-02-05 NOTE — PATIENT INSTRUCTIONS
Has viral symptoms which need to run their course.  Albuterol 2 puffs every 4 hours as needed for wheezing.  Robitussin with codeine 1 or 2 teaspoons every 4 hours, especially at night, for cough.  From behind the counter pseudoephedrine 30 mg, 1 or 2 every 4 hours for nasal congestion.  Can also get Afrin or drugstore brand decongestant nose spray to use twice a day for up to 5 days.  In addition, 2 extra strength Tylenol 3 times a day.  Follow-up as needed.

## 2024-02-06 NOTE — PROGRESS NOTES
Assessment/Plan:  X-rays from 1/2/2024 personally reviewed which show degenerative arthritis of the subtalar joint and a hypertrophic posterior talar process/os trigonum.  Notes reviewed from 1/2/2024 encounter.  Treatment options discussed with patient, recommend patient initiate physical therapy that was initially prescribed by orthopedics with meloxicam on a consistent basis as prescribed by orthopedics.  If this fails to make any significant progress potentially may benefit from a corticosteroid injection and/or possibly an [place] brace.  Follow-up in approximately 6 weeks.    No problem-specific Assessment & Plan notes found for this encounter.   Diagnoses and all orders for this visit:    Osteoarthritis of left ankle or foot    Chronic pain of left ankle  -     Ambulatory Referral to Podiatry          Subjective:      Patient ID: Fernando Zuniga is a 58 y.o. male.    58-year-old male presents with chief complaint painful left ankle which he believes is related to an ankle fracture he sustained 15 to 20 years ago.  Now has pain every day.  Reports having ankle x-ray beginning of January with Ortho consult and then was referred to podiatry for continued care.      The following portions of the patient's history were reviewed and updated as appropriate: allergies, current medications, past family history, past medical history, past social history, past surgical history, and problem list.    Review of Systems   Constitutional: Negative.    HENT: Negative.     Eyes: Negative.    Respiratory: Negative.     Cardiovascular: Negative.    Gastrointestinal: Negative.    Endocrine: Negative.    Genitourinary: Negative.    Musculoskeletal:  Positive for arthralgias (Left ankle) and joint swelling.   Skin: Negative.    Allergic/Immunologic: Negative.    Neurological: Negative.    Hematological: Negative.    Psychiatric/Behavioral: Negative.           Objective:      /80 (BP Location: Left arm, Patient Position: Sitting,  "Cuff Size: Large)   Pulse 76   Ht 5' 6\" (1.676 m)   Wt 85.3 kg (188 lb)   BMI 30.34 kg/m²        Physical Exam  Constitutional:       Appearance: Normal appearance.   HENT:      Head: Normocephalic.      Right Ear: Tympanic membrane normal.      Left Ear: Tympanic membrane normal.      Nose: No congestion.      Mouth/Throat:      Pharynx: No oropharyngeal exudate or posterior oropharyngeal erythema.   Eyes:      Conjunctiva/sclera: Conjunctivae normal.      Pupils: Pupils are equal, round, and reactive to light.   Cardiovascular:      Rate and Rhythm: Normal rate and regular rhythm.      Pulses: Normal pulses.   Pulmonary:      Effort: Pulmonary effort is normal.   Musculoskeletal:         General: Tenderness present.      Right ankle:      Right Achilles Tendon: Normal.      Left ankle: Swelling present. Tenderness present over the lateral malleolus. Decreased range of motion (Limited subtalar joint range of motion).      Left Achilles Tendon: Normal.      Comments: Pain predominantly localized to the lateral ankle lateral malleolus.  No pain with palpation of left posterior tibial tendon as noted on Ortho exam.   Feet:      Right foot:      Protective Sensation: 10 sites tested.        Left foot:      Protective Sensation: 10 sites tested.     Skin:     General: Skin is warm and dry.      Capillary Refill: Capillary refill takes 2 to 3 seconds.      Coloration: Skin is not pale.      Findings: No bruising, erythema, lesion or rash.   Neurological:      General: No focal deficit present.      Mental Status: He is alert.      Cranial Nerves: No cranial nerve deficit.      Sensory: No sensory deficit.      Motor: No weakness.      Gait: Gait normal.      Deep Tendon Reflexes: Reflexes normal.   Psychiatric:         Mood and Affect: Mood normal.         Behavior: Behavior normal.         Judgment: Judgment normal.           "

## 2024-02-12 ENCOUNTER — EVALUATION (OUTPATIENT)
Dept: PHYSICAL THERAPY | Facility: CLINIC | Age: 59
End: 2024-02-12
Payer: COMMERCIAL

## 2024-02-12 DIAGNOSIS — M21.42 PES PLANUS OF LEFT FOOT: ICD-10-CM

## 2024-02-12 DIAGNOSIS — M25.572 CHRONIC PAIN OF LEFT ANKLE: Primary | ICD-10-CM

## 2024-02-12 DIAGNOSIS — M76.822 POSTERIOR TIBIAL TENDON DYSFUNCTION (PTTD) OF LEFT LOWER EXTREMITY: ICD-10-CM

## 2024-02-12 DIAGNOSIS — G89.29 CHRONIC PAIN OF LEFT ANKLE: Primary | ICD-10-CM

## 2024-02-12 PROCEDURE — 97140 MANUAL THERAPY 1/> REGIONS: CPT

## 2024-02-12 PROCEDURE — 97161 PT EVAL LOW COMPLEX 20 MIN: CPT

## 2024-02-12 NOTE — PROGRESS NOTES
PT Evaluation     Today's date: 2024  Patient name: Fernando Zuniga  : 1965  MRN: [id number]  Referring provider: Gibran Burton PA*  Dx:   Encounter Diagnosis     ICD-10-CM    1. Chronic pain of left ankle  M25.572 Ambulatory Referral to Physical Therapy    G89.29       2. Posterior tibial tendon dysfunction (PTTD) of left lower extremity  M76.822 Ambulatory Referral to Physical Therapy      3. Pes planus of left foot  M21.42 Ambulatory Referral to Physical Therapy                     Assessment  Assessment details: Pt is a 58 y.o. year old male that presents to outpatient physical therapy with chronic left ankle pain. Pt demonstrates signs and symptoms that point to possible OA involvement vs movement coordination impairments. Noted tenderness over medial and lateral subtalar joint line. He also demonstrates limited TC, ST, and mid-foot joint mobility as well as poor ankle dorsiflexion and eversion. Pt demonstrates increased pain, decreased ROM, decreased strength, decreased activity tolerance, and decreased functional activity such as walking, squatting, and prolonged standing due to pain. Pt appears motivated; HEP was reviewed and given to patient. Pt would benefit from skilled physical therapy to address noted impairments, meet patient's goals, and to return to PLOF.   Thank you for this referral.     Impairments: abnormal gait, abnormal or restricted ROM, activity intolerance, impaired physical strength, lacks appropriate home exercise program and pain with function    Symptom irritability: lowUnderstanding of Dx/Px/POC: good   Prognosis: good    Goals  STGs:   1. Pt will be able to demonstrate an increase of strength by at least 1/2 grade within 4 weeks   2. Pt will be able to achieve increased ROM by at least 25% within 4 weeks.   3. Pt will be able to report pain less than 7/10 at worse within 4 weeks.  5. Pt will be able to IND ambulate for at least 5 minutes for at least 3 consecutive  mornings without increase of symptoms with 4 weeks.    LTGs:   1. Pt will be independent with all IADLs with pain less than a 4/10  upon discharge.   2. Pt will be independent with HEP upon discharge   3. Pt will be able to ambulate at work for a full work week with pain less than a 5/10 upon discharge.  4. Pt will be able to report pain or discomfort less than a 5/10 with all work duties and recreational activities for a full day upon discharge        Plan  Patient would benefit from: PT eval and skilled physical therapy  Planned modality interventions: low level laser therapy, thermotherapy: hydrocollator packs, electrical stimulation/Russian stimulation and cryotherapy  Planned therapy interventions: manual therapy, massage, Melgoza taping, muscle pump exercises, neuromuscular re-education, patient education, strengthening, stretching, therapeutic activities, therapeutic exercise, therapeutic training, home exercise program, functional ROM exercises, flexibility, breathing training, body mechanics training, balance, IASTM, joint mobilization, kinesiology taping, activity modification and balance/weight bearing training  Frequency: 2x week  Duration in weeks: 8  Treatment plan discussed with: patient        Subjective Evaluation    History of Present Illness  Mechanism of injury: Pt states that he has been having L ankle pain/discomfort for the past few years. He is employed as a .  He reports that since he started working at the [employer], he has had increased shifts hours which causes increased discomfort in his left medial ankle. The pain is located along the inside of his ankle. Denies LORE. Denies any significant swelling.  Feels that he is compensating from this pain, causing pain in the R knee. Reports ankle fracture he sustained 15 to 20 years ago    Denies changes in bowel/bladder. Denies saddle anesthesia. Denies numbness/tingling        AGGS: walking, prolonged standing, standing up  in the morning   EASES: rest, medication   Goals: decrease pain, return to full work shifts without any pain, hiking and tennis       Imaging findings: Xray of L ankle on 2024 - Posterior superior calcaneal spur. Large Stieda process is noted with mild degenerative change at the articulation with the posterior superior calcaneus.    Patient Goals  Patient goals for therapy: increased strength, decreased pain and return to sport/leisure activities    Pain  Current pain ratin  At best pain ratin  At worst pain rating: 10  Quality: dull ache, sharp, tight, discomfort and throbbing          Objective     Tenderness   Left Ankle/Foot   Tenderness in the lateral malleolus, medial calcaneus, medial malleolus and talar dome.     Neurological Testing     Sensation     Ankle/Foot   Left Ankle/Foot   Intact: light touch    Right Ankle/Foot   Intact: light touch     Reflexes   Left   Clonus sign: negative    Right   Clonus sign: negative    Active Range of Motion   Left Ankle/Foot   Dorsiflexion (ke): 2 degrees   Plantar flexion: WFL  Inversion: 14 degrees   Eversion: 3 degrees     Passive Range of Motion   Left Ankle/Foot    Dorsiflexion (ke): 4 degrees   Dorsiflexion (kf): 0 degrees   Plantar flexion: WFL  Inversion: 20 degrees with pain  Eversion: 5 degrees with pain    Joint Play   Left Ankle/Foot  Hypomobile in the talocrural joint, subtalar joint and midfoot.     Strength/Myotome Testing     Left Ankle/Foot   Normal strength    Tests   Left Ankle/Foot   Positive for navicular drop and windlass.   Negative for anterior drawer, posterior drawer and Tran.     Ambulation     Observational Gait   Gait: asymmetric     Quality of Movement During Gait   Trunk  Trunk within functional limits.     Ankle    Ankle (Left): Positive foot drop.              Precautions: prediabetes     Daily Treatment Diary    Date             FOTO IE -             Re-Eval IE               Manuals    TC and ST joint mobs JM - grade  2            Ankle PROM                                       Neuro Re-Ed     Rockerboard taps (lateral;A/P)                                                    Ther Ex    Bike - ROM             Seated arch lifts*             Seated heel raises             Seated gastroc stretch with strap*             Seated soleus stretch with strap*             Standing gastroc stretch *             Standing soleus stretch *             Standing T.P. heel raise with lac ball             Ankle 4 way with TB                                                                 Ther Activity                              Gait Training                              Modalities

## 2024-02-13 ENCOUNTER — TELEPHONE (OUTPATIENT)
Age: 59
End: 2024-02-13

## 2024-02-13 NOTE — TELEPHONE ENCOUNTER
PA for albuterol (PROVENTIL HFA,VENTOLIN HFA)     Submitted via  [x][id number]  []Surescripts-Case ID #   []Faxed to plan   []Other website   []Phone call Case ID #     Office notes sent, clinical questions answered. Awaiting determination

## 2024-02-13 NOTE — TELEPHONE ENCOUNTER
Reason for call:   [x] Prior Auth  [] Other:     Caller:  [x] Patient  [] Pharmacy  Name:   Address:   Callback Number:     Medication:   albuterol (PROVENTIL HFA,VENTOLIN HFA) 90 mcg     Dose/Frequency: Every 4 hrs as needed    Quantity: 18g    Ordering Provider:   [x] PCP/Provider -   [] Speciality/Provider -     Has the patient tried other medications and failed? If failed, which medications did they fail?    [] No   [] Yes -     Is the patient's insurance updated in EPIC?   [x] Yes   [] No     Is a copy of the patient's insurance scanned in EPIC?   [x] Yes   [] No

## 2024-02-14 NOTE — TELEPHONE ENCOUNTER
PA for albuterol (PROVENTIL HFA,VENTOLIN HFA)  Denied    Reason:              Message sent to provider pool Yes    Denial letter scanned into Media Yes    Appeal started No

## 2024-02-19 ENCOUNTER — TELEPHONE (OUTPATIENT)
Dept: FAMILY MEDICINE CLINIC | Facility: CLINIC | Age: 59
End: 2024-02-19

## 2024-02-19 ENCOUNTER — TELEPHONE (OUTPATIENT)
Age: 59
End: 2024-02-19

## 2024-02-19 ENCOUNTER — OFFICE VISIT (OUTPATIENT)
Dept: PHYSICAL THERAPY | Facility: CLINIC | Age: 59
End: 2024-02-19
Payer: COMMERCIAL

## 2024-02-19 DIAGNOSIS — J06.9 VIRAL UPPER RESPIRATORY TRACT INFECTION: ICD-10-CM

## 2024-02-19 DIAGNOSIS — M76.822 POSTERIOR TIBIAL TENDON DYSFUNCTION (PTTD) OF LEFT LOWER EXTREMITY: ICD-10-CM

## 2024-02-19 DIAGNOSIS — G89.29 CHRONIC PAIN OF LEFT ANKLE: Primary | ICD-10-CM

## 2024-02-19 DIAGNOSIS — M25.572 CHRONIC PAIN OF LEFT ANKLE: Primary | ICD-10-CM

## 2024-02-19 DIAGNOSIS — M21.42 PES PLANUS OF LEFT FOOT: ICD-10-CM

## 2024-02-19 PROCEDURE — 97110 THERAPEUTIC EXERCISES: CPT

## 2024-02-19 PROCEDURE — 97140 MANUAL THERAPY 1/> REGIONS: CPT

## 2024-02-19 NOTE — PROGRESS NOTES
"Daily Note     Today's date: 2024  Patient name: Fernando Zuniga  : 1965  MRN: [id number]  Referring provider: Gibran Burton PA*  Dx:   Encounter Diagnosis     ICD-10-CM    1. Chronic pain of left ankle  M25.572     G89.29       2. Posterior tibial tendon dysfunction (PTTD) of left lower extremity  M76.822       3. Pes planus of left foot  M21.42                      Subjective: Pt states that he is doing alright today. Reports that the stretches and HEP seem to help a little bit with the pain, especially in the morning.       Objective: See treatment diary below      Assessment: Tolerated treatment well. Manual techniques were performed to increased ankle mobility and improve TC and ST joint mobility. Activities were also performed to address limited ankle dorsiflexion and plantarflexion and to increase ankle stability and strength. Updated HEP to incorporate additional strengthening activities. Patient demonstrated fatigue post treatment and would benefit from continued PT      Plan: Continue per plan of care.      Precautions: prediabetes     Daily Treatment Diary    Date            FOTO IE -             Re-Eval IE               Manuals    TC and ST joint mobs JM - grade 2 JM - grade 2           Ankle PROM  JM                                     Neuro Re-Ed     Rockerboard taps (lateral;A/P)  1 min holds ea    1 min taps ea                                                  Ther Ex    Bike - ROM  4 mins L2           Seated arch lifts*  2 mins            Seated heel raises  15# 3x20 HEP          Seated gastroc stretch with strap*  30\"x2           Seated soleus stretch with strap*             Standing gastroc stretch *  30\"x2           Standing soleus stretch *  30\"x2           Standing T.P. heel raise and toe raise with lac ball  2x12 ea   HEP          Ankle 4 way with TB  OTB 3x10 dorsi  2x10 In/Ev                                                               Ther Activity                   "            Gait Training                              Modalities

## 2024-02-19 NOTE — TELEPHONE ENCOUNTER
Beatriz called, states Insurance request a Dr authorization for albuterol (PROVENTIL HFA,VENTOLIN HFA) 90 mcg/act inhaler   Upon chart review/ medications, encounters, contacted practice/clinical , patient was warm transferred to practice/ for further clarification.

## 2024-02-19 NOTE — TELEPHONE ENCOUNTER
Suggest continuing the Robitussin with codeine as needed as cough can take a few weeks to erik.  Refill can be called in if he would like.

## 2024-02-19 NOTE — TELEPHONE ENCOUNTER
Patient better but still having the cough and the tingling in his throat. He is breathing better and he states that if the insurance dose not approve he would be good without it but he wants to know what do you recommend for the cough. He is still taking the syrup that you sent. But he is still coughing.

## 2024-02-20 NOTE — TELEPHONE ENCOUNTER
I tried calling patient but could not leave a message. When I spoke to him he did mention that he did not have a lot of the cough medication left. If you recommend for him to continue please send a refill.

## 2024-02-21 ENCOUNTER — TELEPHONE (OUTPATIENT)
Dept: PAIN MEDICINE | Facility: CLINIC | Age: 59
End: 2024-02-21

## 2024-02-21 RX ORDER — CODEINE PHOSPHATE/GUAIFENESIN 10-100MG/5
LIQUID (ML) ORAL
Qty: 240 ML | Refills: 0 | Status: SHIPPED | OUTPATIENT
Start: 2024-02-21

## 2024-02-21 NOTE — TELEPHONE ENCOUNTER
Tried contacting pt, mailbox full  We are unable to complete disability forms as we did not take the pt out of work  Pt would needs forms filled out by PCP

## 2024-02-21 NOTE — TELEPHONE ENCOUNTER
I tried calling patient to advise that Dr Benz sent another refill for the cough medication guaifenesin but he did not answer and the mailbox is full.

## 2024-02-26 ENCOUNTER — OFFICE VISIT (OUTPATIENT)
Dept: PHYSICAL THERAPY | Facility: CLINIC | Age: 59
End: 2024-02-26
Payer: COMMERCIAL

## 2024-02-26 DIAGNOSIS — M76.822 POSTERIOR TIBIAL TENDON DYSFUNCTION (PTTD) OF LEFT LOWER EXTREMITY: ICD-10-CM

## 2024-02-26 DIAGNOSIS — M25.572 CHRONIC PAIN OF LEFT ANKLE: Primary | ICD-10-CM

## 2024-02-26 DIAGNOSIS — M21.42 PES PLANUS OF LEFT FOOT: ICD-10-CM

## 2024-02-26 DIAGNOSIS — G89.29 CHRONIC PAIN OF LEFT ANKLE: Primary | ICD-10-CM

## 2024-02-26 PROCEDURE — 97014 ELECTRIC STIMULATION THERAPY: CPT

## 2024-02-26 PROCEDURE — 97140 MANUAL THERAPY 1/> REGIONS: CPT

## 2024-02-26 PROCEDURE — 97110 THERAPEUTIC EXERCISES: CPT

## 2024-02-26 NOTE — PROGRESS NOTES
"Daily Note     Today's date: 2024  Patient name: Fernando Zuniga  : 1965  MRN: [id number]  Referring provider: Gibran Burton PA*  Dx:   Encounter Diagnosis     ICD-10-CM    1. Chronic pain of left ankle  M25.572     G89.29       2. Posterior tibial tendon dysfunction (PTTD) of left lower extremity  M76.822       3. Pes planus of left foot  M21.42                      Subjective: Pt states that he is feeling pretty sore today prior to today's session. Reports that Monday's are his tough days with a lot of walking and carrying for work. States that the updated HEP is going well.      Objective: See treatment diary below      Assessment: Tolerated treatment well. Pt reported feeling better following the standing dorsiflexion stretches. Manual techniques were performed to increase TC and ST joint mobility. Additional activities were also performed to improve ankle stability and endurance. Recommended to continue with current HEP at this time and to use modalities as needed. Patient demonstrated fatigue post treatment and would benefit from continued PT      Plan: Continue per plan of care.      Precautions: prediabetes     Daily Treatment Diary    Date           FOTO IE -             Re-Eval IE               Manuals    TC and ST joint mobs JM - grade 2 JM - grade 2 JM - grade 2          Ankle PROM  JM JM                                    Neuro Re-Ed     Rockerboard taps (lateral;A/P)  1 min holds ea    1 min taps ea 2 min taps ea                                                 Ther Ex    Bike - ROM  4 mins L2 2 min L2          Seated arch lifts*  2 mins  3 mins          Seated heel raises  15# 3x20 15# 3x20 on half foam          Seated gastroc stretch with strap*  30\"x2           Seated soleus stretch with strap*             Standing gastroc stretch *  30\"x2 30\"x2          Standing soleus stretch *  30\"x2 30\"x2          Standing T.P. heel raise and toe raise with lac ball  2x12 ea   HEP    "       Ankle 4 way with TB  OTB 3x10 dorsi  2x10 In/Ev                                                               Ther Activity                              Gait Training                              Modalities    Ice and ESTIM   Perf - 5 mins

## 2024-02-29 DIAGNOSIS — M25.572 CHRONIC PAIN OF LEFT ANKLE: ICD-10-CM

## 2024-02-29 DIAGNOSIS — G89.29 CHRONIC PAIN OF LEFT ANKLE: ICD-10-CM

## 2024-02-29 DIAGNOSIS — M25.561 ACUTE PAIN OF RIGHT KNEE: ICD-10-CM

## 2024-02-29 RX ORDER — MELOXICAM 15 MG/1
15 TABLET ORAL DAILY PRN
Qty: 90 TABLET | Refills: 1 | Status: SHIPPED | OUTPATIENT
Start: 2024-02-29

## 2024-03-04 ENCOUNTER — OFFICE VISIT (OUTPATIENT)
Dept: PHYSICAL THERAPY | Facility: CLINIC | Age: 59
End: 2024-03-04
Payer: COMMERCIAL

## 2024-03-04 DIAGNOSIS — M76.822 POSTERIOR TIBIAL TENDON DYSFUNCTION (PTTD) OF LEFT LOWER EXTREMITY: ICD-10-CM

## 2024-03-04 DIAGNOSIS — M21.42 PES PLANUS OF LEFT FOOT: ICD-10-CM

## 2024-03-04 DIAGNOSIS — G89.29 CHRONIC PAIN OF LEFT ANKLE: Primary | ICD-10-CM

## 2024-03-04 DIAGNOSIS — M25.572 CHRONIC PAIN OF LEFT ANKLE: Primary | ICD-10-CM

## 2024-03-04 PROCEDURE — 97112 NEUROMUSCULAR REEDUCATION: CPT

## 2024-03-04 PROCEDURE — 97140 MANUAL THERAPY 1/> REGIONS: CPT

## 2024-03-04 PROCEDURE — 97110 THERAPEUTIC EXERCISES: CPT

## 2024-03-04 NOTE — PROGRESS NOTES
Daily Note     Today's date: 3/4/2024  Patient name: Fernando Zuniga  : 1965  MRN: [id number]  Referring provider: Gibran Burton PA*  Dx:   Encounter Diagnosis     ICD-10-CM    1. Chronic pain of left ankle  M25.572     G89.29       2. Posterior tibial tendon dysfunction (PTTD) of left lower extremity  M76.822       3. Pes planus of left foot  M21.42                       Subjective: Pt states that he continues to noticing improvements in symptoms with the stretching. Indicates that he is able to walk on in with 15 minutes now in the morning rather than the 30 minutes reported prior to physical therapy.       Objective: See treatment diary below      Assessment: Tolerated treatment well. Additional manual techniques were performed to continue to address limited ankle dorsiflexion and eversion. Activiites were also performed to improve Wbing stability and ankle strategy with balance. Still benefiting from outpatient vs home physical therapy only for use of specialized equipment, skilled progressions, and manual therapy. Still has functional limitations in decreased standing/walking tolerance, difficulty with stair ambulation, and decreased quality of life associated with pain. Patient demonstrated fatigue post treatment and would benefit from continued PT      Plan: Continue per plan of care.      Precautions: prediabetes     Daily Treatment Diary    Date 2/12 2/19 2/26 3/4         FOTO IE -             Re-Eval IE               Manuals    TC and ST joint mobs JM - grade 2 JM - grade 2 JM - grade 2 JM - grade 2         Ankle PROM  JM JM JM         Side lying heel whip    JM - grade 2                      Neuro Re-Ed     Rockerboard taps (lateral;A/P)  1 min holds ea    1 min taps ea 2 min taps ea 2 min taps ea         Tandem stance on foam     1 min ea direction         Full squat on foam     2x10                      Ther Ex    Bike - ROM  4 mins L2 2 min L2 3 min L3         Seated arch lifts*  2 mins  3  "mins          Seated heel raises  15# 3x20 15# 3x20 on half foam 20# 3x20 on half foam         Seated gastroc stretch with strap*  30\"x2           Seated soleus stretch with strap*             Standing gastroc stretch *  30\"x2 30\"x2 30\"x2         Standing soleus stretch *  30\"x2 30\"x2 30\"x2         Standing T.P. heel raise and toe raise with lac ball  2x12 ea   HEP          Ankle 4 way with TB  OTB 3x10 dorsi  2x10 In/Ev  OTB 3x10 dorsi  2x10 In/Ev         Eccentric step down (heel taps)    3x6 6\" step                                                Ther Activity                              Gait Training                              Modalities    Ice and ESTIM   Perf - 5 mins                                    "

## 2024-03-11 ENCOUNTER — OFFICE VISIT (OUTPATIENT)
Dept: PHYSICAL THERAPY | Facility: CLINIC | Age: 59
End: 2024-03-11
Payer: COMMERCIAL

## 2024-03-11 DIAGNOSIS — M76.822 POSTERIOR TIBIAL TENDON DYSFUNCTION (PTTD) OF LEFT LOWER EXTREMITY: ICD-10-CM

## 2024-03-11 DIAGNOSIS — M21.42 PES PLANUS OF LEFT FOOT: ICD-10-CM

## 2024-03-11 DIAGNOSIS — G89.29 CHRONIC PAIN OF LEFT ANKLE: Primary | ICD-10-CM

## 2024-03-11 DIAGNOSIS — M25.572 CHRONIC PAIN OF LEFT ANKLE: Primary | ICD-10-CM

## 2024-03-11 PROCEDURE — 97110 THERAPEUTIC EXERCISES: CPT

## 2024-03-11 PROCEDURE — 97140 MANUAL THERAPY 1/> REGIONS: CPT

## 2024-03-11 NOTE — PROGRESS NOTES
Daily Note + Discharge Note    Today's date: 3/11/2024  Patient name: Fernando Zuniga  : 1965  MRN: [id number]  Referring provider: Gibran Burton PA*  Dx:   Encounter Diagnosis     ICD-10-CM    1. Chronic pain of left ankle  M25.572     G89.29       2. Posterior tibial tendon dysfunction (PTTD) of left lower extremity  M76.822       3. Pes planus of left foot  M21.42                      Subjective: Pt states that he feeling about the same as he did last week without to much change. Continues to report that the HEP is very helpful for decreasing his pain and getting him going in the morning. Has been using ice regularly for pain management at the end of the day.       Objective: See treatment diary below    Active Range of Motion   Left Ankle/Foot   Dorsiflexion (ke): 9 degrees   Plantar flexion: WFL  Inversion: 19 degrees   Eversion: 10 degrees     Passive Range of Motion   Left Ankle/Foot    Dorsiflexion (ke): 15 degrees   Dorsiflexion (kf): 3 degrees   Plantar flexion: WFL  Inversion: 20 degrees with pain  Eversion: 12 degrees with pain    Goals  STGs:   1. Pt will be able to demonstrate an increase of strength by at least 1/2 grade within 4 weeks (MET)  2. Pt will be able to achieve increased ROM by at least 25% within 4 weeks. (MET)  3. Pt will be able to report pain less than 7/10 at worse within 4 weeks. (MET - 5/10)  5. Pt will be able to IND ambulate for at least 5 minutes for at least 3 consecutive mornings without increase of symptoms with 4 weeks. (Progressing)    LTGs:   1. Pt will be independent with all IADLs with pain less than a 4/10  upon discharge. (Nearly met)  2. Pt will be independent with HEP upon discharge (MET)  3. Pt will be able to ambulate at work for a full work week with pain less than a 5/10 upon discharge. (Nearly MET)  4. Pt will be able to report pain or discomfort less than a 5/10 with all work duties and recreational activities for a full day upon discharge  "(MET)      Assessment: Tolerated treatment well. Patient demonstrated fatigue post treatment. Pt has demonstrated improved ankle mobility, increase strength and increased activity tolerance with work and ADLs. Pt would like to transition to HEP at this time and see how it feels. Plan to discharge patient at this time with HEP at this time which was reviewed and performed with patient. Recommended to continue with HEP daily for the next few months and to reach out to clinic with any questions or concerns.       Plan:    Advised patient to continue with home exercise program which I reviewed with them today. Advised patient to contact me with any future questions or concerns regarding exercise. Pt is in agreement with discharge plan.       Precautions: prediabetes     Daily Treatment Diary    Date 2/12 2/19 2/26 3/4 3/11        FOTO IE -     PERF        Re-Eval IE               Manuals    TC and ST joint mobs JM - grade 2 JM - grade 2 JM - grade 2 JM - grade 2 JM - grade 2        Ankle PROM  JM JM JM JM        Side lying heel whip    JM - grade 2                      Neuro Re-Ed     Rockerboard taps (lateral;A/P)  1 min holds ea    1 min taps ea 2 min taps ea 2 min taps ea 2 min taps ea        Tandem stance on foam     1 min ea direction         Full squat on foam     2x10 2x10 HEP (on floor)                    Ther Ex    Bike - ROM  4 mins L2 2 min L2 3 min L3 3 min L3        Seated arch lifts*  2 mins  3 mins          Seated heel raises  15# 3x20 15# 3x20 on half foam 20# 3x20 on half foam 20# 3x10 on half foam HEP       Seated gastroc stretch with strap*  30\"x2    HEP       Seated soleus stretch with strap*      HEP       Standing gastroc stretch *  30\"x2 30\"x2 30\"x2 30\"x2 HEP       Standing soleus stretch *  30\"x2 30\"x2 30\"x2 30\"x2 HEP       Standing T.P. heel raise and toe raise with lac ball  2x12 ea   HEP  2x12 ea   HEP       Ankle 4 way with TB  OTB 3x10 dorsi  2x10 In/Ev  OTB 3x10 dorsi  2x10 In/Ev HEP HEP    " "   Eccentric step down (heel taps)    3x6 6\" step 3x6 6\" step HEP                                              Ther Activity                              Gait Training                              Modalities    Ice and ESTIM   Perf - 5 mins                                      "

## 2024-03-20 ENCOUNTER — HOSPITAL ENCOUNTER (EMERGENCY)
Facility: HOSPITAL | Age: 59
Discharge: HOME | End: 2024-03-20
Attending: EMERGENCY MEDICINE | Admitting: EMERGENCY MEDICINE
Payer: COMMERCIAL

## 2024-03-20 ENCOUNTER — APPOINTMENT (EMERGENCY)
Dept: CT IMAGING | Facility: HOSPITAL | Age: 59
End: 2024-03-20
Payer: COMMERCIAL

## 2024-03-20 ENCOUNTER — TELEPHONE (OUTPATIENT)
Dept: OTHER | Facility: HOSPITAL | Age: 59
End: 2024-03-20

## 2024-03-20 VITALS
TEMPERATURE: 97.8 F | HEART RATE: 52 BPM | OXYGEN SATURATION: 96 % | DIASTOLIC BLOOD PRESSURE: 83 MMHG | SYSTOLIC BLOOD PRESSURE: 130 MMHG | RESPIRATION RATE: 18 BRPM

## 2024-03-20 DIAGNOSIS — N20.0 KIDNEY STONE: Primary | ICD-10-CM

## 2024-03-20 DIAGNOSIS — R10.9 RIGHT FLANK PAIN: ICD-10-CM

## 2024-03-20 LAB
ALBUMIN SERPL BCP-MCNC: 4.3 G/DL (ref 3.5–5)
ALP SERPL-CCNC: 56 U/L (ref 34–104)
ALT SERPL W P-5'-P-CCNC: 31 U/L (ref 7–52)
ANION GAP SERPL CALCULATED.3IONS-SCNC: 6 MMOL/L (ref 4–13)
AST SERPL W P-5'-P-CCNC: 23 U/L (ref 13–39)
BACTERIA UR QL AUTO: ABNORMAL /HPF
BASOPHILS # BLD AUTO: 0.04 THOUSANDS/ÂΜL (ref 0–0.1)
BASOPHILS NFR BLD AUTO: 0 % (ref 0–1)
BILIRUB SERPL-MCNC: 0.52 MG/DL (ref 0.2–1)
BILIRUB UR QL STRIP: NEGATIVE
BUN SERPL-MCNC: 20 MG/DL (ref 5–25)
CALCIUM SERPL-MCNC: 9.3 MG/DL (ref 8.4–10.2)
CHLORIDE SERPL-SCNC: 104 MMOL/L (ref 96–108)
CLARITY UR: ABNORMAL
CO2 SERPL-SCNC: 28 MMOL/L (ref 21–32)
COLOR UR: YELLOW
CREAT SERPL-MCNC: 0.92 MG/DL (ref 0.6–1.3)
EOSINOPHIL # BLD AUTO: 0.03 THOUSAND/ÂΜL (ref 0–0.61)
EOSINOPHIL NFR BLD AUTO: 0 % (ref 0–6)
ERYTHROCYTE [DISTWIDTH] IN BLOOD BY AUTOMATED COUNT: 13.4 % (ref 11.6–15.1)
GFR SERPL CREATININE-BSD FRML MDRD: 92 ML/MIN/1.73SQ M
GLUCOSE SERPL-MCNC: 146 MG/DL (ref 65–140)
GLUCOSE UR STRIP-MCNC: NEGATIVE MG/DL
HCT VFR BLD AUTO: 43.4 % (ref 36.5–49.3)
HGB BLD-MCNC: 14.5 G/DL (ref 12–17)
HGB UR QL STRIP.AUTO: ABNORMAL
IMM GRANULOCYTES # BLD AUTO: 0.04 THOUSAND/UL (ref 0–0.2)
IMM GRANULOCYTES NFR BLD AUTO: 0 % (ref 0–2)
KETONES UR STRIP-MCNC: NEGATIVE MG/DL
LEUKOCYTE ESTERASE UR QL STRIP: NEGATIVE
LIPASE SERPL-CCNC: 18 U/L (ref 11–82)
LYMPHOCYTES # BLD AUTO: 1.84 THOUSANDS/ÂΜL (ref 0.6–4.47)
LYMPHOCYTES NFR BLD AUTO: 18 % (ref 14–44)
MCH RBC QN AUTO: 31.3 PG (ref 26.8–34.3)
MCHC RBC AUTO-ENTMCNC: 33.4 G/DL (ref 31.4–37.4)
MCV RBC AUTO: 94 FL (ref 82–98)
MONOCYTES # BLD AUTO: 0.64 THOUSAND/ÂΜL (ref 0.17–1.22)
MONOCYTES NFR BLD AUTO: 6 % (ref 4–12)
MUCOUS THREADS UR QL AUTO: ABNORMAL
NEUTROPHILS # BLD AUTO: 7.44 THOUSANDS/ÂΜL (ref 1.85–7.62)
NEUTS SEG NFR BLD AUTO: 76 % (ref 43–75)
NITRITE UR QL STRIP: NEGATIVE
NON-SQ EPI CELLS URNS QL MICRO: ABNORMAL /HPF
NRBC BLD AUTO-RTO: 0 /100 WBCS
PH UR STRIP.AUTO: 7 [PH] (ref 4.5–8)
PLATELET # BLD AUTO: 287 THOUSANDS/UL (ref 149–390)
PMV BLD AUTO: 11.3 FL (ref 8.9–12.7)
POTASSIUM SERPL-SCNC: 4 MMOL/L (ref 3.5–5.3)
PROT SERPL-MCNC: 7.3 G/DL (ref 6.4–8.4)
PROT UR STRIP-MCNC: NEGATIVE MG/DL
RBC # BLD AUTO: 4.64 MILLION/UL (ref 3.88–5.62)
RBC #/AREA URNS AUTO: ABNORMAL /HPF
SODIUM SERPL-SCNC: 138 MMOL/L (ref 135–147)
SP GR UR STRIP.AUTO: >=1.03 (ref 1–1.03)
UROBILINOGEN UR QL STRIP.AUTO: 0.2 E.U./DL
WBC # BLD AUTO: 10.03 THOUSAND/UL (ref 4.31–10.16)
WBC #/AREA URNS AUTO: ABNORMAL /HPF

## 2024-03-20 PROCEDURE — 83690 ASSAY OF LIPASE: CPT

## 2024-03-20 PROCEDURE — 85025 COMPLETE CBC W/AUTO DIFF WBC: CPT

## 2024-03-20 PROCEDURE — 80053 COMPREHEN METABOLIC PANEL: CPT

## 2024-03-20 PROCEDURE — 74174 CTA ABD&PLVS W/CONTRAST: CPT

## 2024-03-20 PROCEDURE — 81001 URINALYSIS AUTO W/SCOPE: CPT

## 2024-03-20 PROCEDURE — 96375 TX/PRO/DX INJ NEW DRUG ADDON: CPT

## 2024-03-20 PROCEDURE — 96374 THER/PROPH/DIAG INJ IV PUSH: CPT

## 2024-03-20 PROCEDURE — 99284 EMERGENCY DEPT VISIT MOD MDM: CPT

## 2024-03-20 PROCEDURE — 96361 HYDRATE IV INFUSION ADD-ON: CPT

## 2024-03-20 PROCEDURE — 99204 OFFICE O/P NEW MOD 45 MIN: CPT | Performed by: UROLOGY

## 2024-03-20 PROCEDURE — 99285 EMERGENCY DEPT VISIT HI MDM: CPT | Performed by: EMERGENCY MEDICINE

## 2024-03-20 PROCEDURE — 36415 COLL VENOUS BLD VENIPUNCTURE: CPT

## 2024-03-20 PROCEDURE — 71275 CT ANGIOGRAPHY CHEST: CPT

## 2024-03-20 RX ORDER — ACETAMINOPHEN 325 MG/1
650 TABLET ORAL ONCE
Status: COMPLETED | OUTPATIENT
Start: 2024-03-20 | End: 2024-03-20

## 2024-03-20 RX ORDER — OXYCODONE HYDROCHLORIDE 5 MG/1
5 TABLET ORAL EVERY 8 HOURS PRN
Qty: 8 TABLET | Refills: 0 | Status: SHIPPED | OUTPATIENT
Start: 2024-03-20 | End: 2024-03-30

## 2024-03-20 RX ORDER — KETOROLAC TROMETHAMINE 30 MG/ML
15 INJECTION, SOLUTION INTRAMUSCULAR; INTRAVENOUS ONCE
Status: COMPLETED | OUTPATIENT
Start: 2024-03-20 | End: 2024-03-20

## 2024-03-20 RX ORDER — IBUPROFEN 600 MG/1
600 TABLET ORAL EVERY 6 HOURS PRN
Qty: 40 TABLET | Refills: 0 | Status: SHIPPED | OUTPATIENT
Start: 2024-03-20

## 2024-03-20 RX ORDER — TAMSULOSIN HYDROCHLORIDE 0.4 MG/1
0.4 CAPSULE ORAL
Qty: 30 CAPSULE | Refills: 1 | Status: SHIPPED | OUTPATIENT
Start: 2024-03-20

## 2024-03-20 RX ORDER — HYDROMORPHONE HCL/PF 1 MG/ML
0.5 SYRINGE (ML) INJECTION ONCE
Status: COMPLETED | OUTPATIENT
Start: 2024-03-20 | End: 2024-03-20

## 2024-03-20 RX ADMIN — IOHEXOL 100 ML: 350 INJECTION, SOLUTION INTRAVENOUS at 11:50

## 2024-03-20 RX ADMIN — ACETAMINOPHEN 325MG 650 MG: 325 TABLET ORAL at 11:05

## 2024-03-20 RX ADMIN — SODIUM CHLORIDE 1000 ML: 0.9 INJECTION, SOLUTION INTRAVENOUS at 11:04

## 2024-03-20 RX ADMIN — KETOROLAC TROMETHAMINE 15 MG: 30 INJECTION, SOLUTION INTRAMUSCULAR; INTRAVENOUS at 11:05

## 2024-03-20 RX ADMIN — HYDROMORPHONE HYDROCHLORIDE 0.5 MG: 1 INJECTION, SOLUTION INTRAMUSCULAR; INTRAVENOUS; SUBCUTANEOUS at 11:08

## 2024-03-20 NOTE — ED ATTENDING ATTESTATION
3/20/2024  I, Barry Abreu MD, saw and evaluated the patient. I have discussed the patient with the resident/non-physician practitioner and agree with the resident's/non-physician practitioner's findings, Plan of Care, and MDM as documented in the resident's/non-physician practitioner's note, except where noted. All available labs and Radiology studies were reviewed.  I was present for key portions of any procedure(s) performed by the resident/non-physician practitioner and I was immediately available to provide assistance.       At this point I agree with the current assessment done in the Emergency Department.  I have conducted an independent evaluation of this patient a history and physical is as follows:    Developed pain 10 minutes after eating, it has zahra constant, radiating to the flank. States he has had kidney stones in the past and this is different. States he has never had an abdominal surgery. Pt denies CP/SOB/F/C/N/V/D/C, no dysuria, burning on urination or blood in urine.     Gen: Pt is in NAD, uncomfortable  HEENT: Head is atraumatic, EOM's intact, neck has FROM  Chest: CTAB, non-tender  Heart: RRR  Abdomen: Soft, NT/ND  Musculoskeletal: FROM in all extremities  Skin: No rash, no ecchymosis  Neuro: Awake, alert, oriented x4; Cranial nerves II-XII intact  Psych: Normal affect    MDM -  Will check CBC for leukocytosis, metabolic panel for electrolyte abnormalities and dehydration, urine for infection,  LFT's to assess GB dysfunction, lipase for pancreatitis. Will check a CTA of the chest abdomen and pelvis to rule out aortic dissection.     ED Course         Critical Care Time  Procedures

## 2024-03-20 NOTE — ED NOTES
Pt's wife states that Urology has been in to see pt and that pt will be d/c'd with pain meds and OP f/u. Awaiting d/c instructions.      Meghan Mccarty RN  03/20/24 [id number]

## 2024-03-20 NOTE — CONSULTS
Consult - Urology   Fernando Zuniga 1965, 58 y.o. male MRN: [id number]    Unit/Bed#: ED-18 Encounter: [id number]    Assessment & Plan  Discussed with patient the treatment options for acute stone disease.  We have discussed continued trial of passage, with hydration, Flomax and time with follow-up imaging in 4 weeks if no spontaneous passage. We have alternatively discussed ureteroscopy, laser lithotripsy and/or temporary ureteral stent placement.  We discussed the risks benefits and time frame for each option. Starting today patient will strain all urine, notify office if spontaneous passage, strainers/specimen cup provided.       I was unable to show him and his family the images due to computer failure in his exam room but I drew a diagram of his RIGHT DISTAL URETERAL STONE and hydronephrosis. He understands and feels confident with medical expulsive therapy after our discussion. Medications flomax, oxycodone, ibuprofen sent to his pharmacy as well as a strainer and specimen cup for home and how to use that.He is discharged home from ER can followup with me in 4 weeks. He will call to let me know if he passes his stone sooner or alternatively if he is having intractable symptoms he knows to return to the hospital for associated fevers chills rigors vomiting or generally feeling worse.    Lab Results   Component Value Date    WBC 10.03 03/20/2024    HGB 14.5 03/20/2024    HCT 43.4 03/20/2024    MCV 94 03/20/2024     03/20/2024     Lab Results   Component Value Date    CREATININE 0.92 03/20/2024           Subjective: 58 year old man with history of stone disease, 2 or 3 prior episodes with spontaneous passage years ago. Presents to [facility] ER today with acute right flank pain starting this morning while at work, some associated nausea and cramping. Voiding OK no fevers no vomiting no diarrhea. Pain has intensified and localized to the right flank radiates into the right lower quadrant. Now is very comfortable  minimal pain after toradol and dilaudid in ER.      Review of Systems   Constitutional:  Negative for activity change, appetite change, chills, fever and unexpected weight change.   HENT: Negative.     Respiratory: Negative.  Negative for shortness of breath.    Cardiovascular: Negative.  Negative for chest pain.   Gastrointestinal:  Negative for abdominal pain, diarrhea, nausea and vomiting.   Endocrine: Negative.    Genitourinary:  Positive for flank pain. Negative for decreased urine volume, difficulty urinating, dysuria, frequency, hematuria and urgency.   Musculoskeletal:  Negative for back pain and gait problem.   Skin: Negative.    Allergic/Immunologic: Negative.    Neurological: Negative.    Hematological:  Negative for adenopathy. Does not bruise/bleed easily.       Objective:  Vitals: Blood pressure 130/83, pulse (!) 52, temperature 97.8 °F (36.6 °C), temperature source Oral, resp. rate 18, SpO2 96%.,There is no height or weight on file to calculate BMI.    Physical Exam  Vitals and nursing note reviewed.   Constitutional:       Appearance: He is well-developed.   HENT:      Head: Normocephalic and atraumatic.   Cardiovascular:      Rate and Rhythm: Normal rate and regular rhythm.      Heart sounds: Normal heart sounds. No murmur heard.  Pulmonary:      Effort: Pulmonary effort is normal.      Breath sounds: Normal breath sounds.   Abdominal:      General: Bowel sounds are normal.      Palpations: Abdomen is soft.      Tenderness: There is no right CVA tenderness or left CVA tenderness.   Musculoskeletal:         General: Normal range of motion.   Skin:     General: Skin is warm and dry.      Capillary Refill: Capillary refill takes less than 2 seconds.      Coloration: Skin is not pale.   Neurological:      Mental Status: He is alert and oriented to person, place, and time.         Imaging:    Procedure Component Value Units Date/Time   CTA dissection protocol chest/abdomen/pelvis [[id number]] Collected:  03/20/24 1255   Order Status: Completed Updated: 03/20/24 1307   Narrative:     CTA - CHEST, ABDOMEN AND PELVIS - WITHOUT AND WITH IV CONTRAST    INDICATION: initial periumbilical pain, progressed to right flank radiating to RLQ pain. no history of abdominal surgery. history of kidney stone.    COMPARISON: None.    TECHNIQUE: CT examination of the chest, abdomen and pelvis was performed both prior to and after the administration of intravenous contrast. The noncontrast portion of this examination was performed utilizing low radiation dose technique.  Thin section  angiographic arterial phase post contrast technique was used in order to evaluate for aortic dissection. 3D reformatted images and volume rendering were performed on an independent workstation. Multiplanar 2D reformatted images were created from the  source data.    Radiation dose length product (DLP) for this visit: 1057 mGy-cm . This examination, like all CT scans performed in the [facility], was performed utilizing techniques to minimize radiation dose exposure, including the use of iterative  reconstruction and automated exposure control.    IV Contrast: 100 mL of iohexol (OMNIPAQUE)  Enteric Contrast: Not administered.    FINDINGS:    AORTA: No aortic dissection or intramural hematoma. No aortic aneurysm.    No significant atherosclerotic disease. Specifically, no flow limiting atherosclerotic stenosis of aorta or major aortic branch vessel in the chest, abdomen or pelvis.      CHEST    LUNGS: Lungs are clear. No tracheal or endobronchial lesion.    PLEURA: Unremarkable.    HEART/PULMONARY ARTERIAL TREE: Unremarkable for patient's age.    MEDIASTINUM AND NORMA: Unremarkable.    CHEST WALL AND LOWER NECK: Unremarkable.    ABDOMEN    LIVER/BILIARY TREE: Enlarged liver with diffuse fatty filtration. Peripherally enhancing 2.5 cm hypodense lesion in the dome of the left hepatic lobe lateral segment suggestive of hemangioma. Ill-defined 1.5  cm hypodensity in the medial aspect of the  right hepatic lobe posterior segment image 3/104 suggestive of a flash filling hemangioma. Normal hepatic contours. No biliary dilation.    GALLBLADDER: No calcified gallstones. No pericholecystic inflammatory change.    SPLEEN: Unremarkable.    PANCREAS: Unremarkable.    ADRENAL GLANDS: Unremarkable.    KIDNEYS/URETERS: 4 mm calculus in the mid to distal ureter, at the level of the inferior border of the sacrum, causing mild hydroureteronephrosis. No perinephric stranding.    STOMACH AND BOWEL: Unremarkable.    APPENDIX: Normal.    ABDOMINOPELVIC CAVITY: No ascites. No pneumoperitoneum. No lymphadenopathy.    PELVIS    REPRODUCTIVE ORGANS: Unremarkable for patient's age.    URINARY BLADDER: Unremarkable.    ABDOMINAL WALL/INGUINAL REGIONS: Unremarkable.    BONES: No acute fracture or suspicious osseous lesion.   Impression:       No acute aortic syndrome.    Mildly obstructing 4 mm calculus in the right mid to distal ureter.    Hepatomegaly with fatty filtration, and several small hemangiomas.     Imaging reviewed - both report and images personally reviewed.     Labs:  Recent Labs     03/20/24  1101   WBC 10.03     Recent Labs     03/20/24  1101   HGB 14.5       Recent Labs     03/20/24  1101   CREATININE 0.92       Microbiology:  03/20/2024 1029 03/20/2024 1104 Urine Microscopic [[id number]]   (Abnormal)   Urine, Clean Catch    Final result Component Value Units   RBC, UA Innumerable Abnormal  /hpf   WBC, UA 2-4 Abnormal  /hpf   Epithelial Cells None Seen /hpf   Bacteria, UA None Seen /hpf   MUCUS THREADS Occasional Abnormal               History:  Social History     Socioeconomic History    Marital status: /Civil Union     Spouse name: None    Number of children: None    Years of education: None    Highest education level: None   Occupational History    None   Tobacco Use    Smoking status: Never    Smokeless tobacco: Never   Vaping Use    Vaping status: Never  Used   Substance and Sexual Activity    Alcohol use: Yes     Comment: socially    Drug use: Never    Sexual activity: None   Other Topics Concern    None   Social History Narrative     since 89.      Is a .     Wife worked as a registrar at [employer] .Quit  when her father became ill.     5 kids.     Oldest, Aaron 34, Working for [employer].  Lives in [place].     Addie 33, . Teacher. Grad of [employer]. Lives in [place]  2 daughters.  is a .    Alexia 31. Grad of [employer]. CNA. Lives in [place].    Fernando going to [employer] .    Esmarilda 12. 7TH grade.         Enjoys bike riding, camping, fishing, boating.        Father  at 74 from Covid in 3/20.     Social Determinants of Health     Financial Resource Strain: Not on file   Food Insecurity: Not on file   Transportation Needs: Not on file   Physical Activity: Not on file   Stress: Not on file   Social Connections: Not on file   Intimate Partner Violence: Not on file   Housing Stability: Not on file       Past Medical History:   Diagnosis Date    Chronic pain of left ankle 01/10/2024    Prediabetes 2023    Renal colic 08/28/2013    x 2     Past Surgical History:   Procedure Laterality Date    FL NEUROPLASTY &/TRANSPOS MEDIAN NRV CARPAL TUNNE Left 2023    Procedure: CTR , LEFT;  Surgeon: Sd Orellana MD;  Location: [facility];  Service: Orthopedics     Family History   Problem Relation Age of Onset    Diabetes Mother     Hypertension Father        Kristal Garay PA-C  Date: 3/20/2024 Time: 6:00 PM

## 2024-03-20 NOTE — DISCHARGE INSTRUCTIONS
You were seen in the Emergency Department today for right flank pain and found to have a kidney stone. Please follow up with urology as soon as able for re-evaluation and further management.      Please return to the Emergency Department if you experience worsening of your current symptoms or any other concerning symptoms including fever, chills, uncontrolled pain.

## 2024-03-20 NOTE — TELEPHONE ENCOUNTER
i personally saw pt in consult today in er for right distal ureteral stone 4mm and hydro. very comfortable with meds and discharge plan for MET. I ordered him a f/u US in 4 weeks and he can see any LAVERNE then

## 2024-03-20 NOTE — ED PROVIDER NOTES
History  Chief Complaint   Patient presents with    Abdominal Pain     R abd that began this AM, getting worse per pt report.      HPI    Patient is a 58 y.o. male with PMHx kidney stone who presents to the ED via private vehicle for evaluation of abdominal pain since this morning. Reports pain was localized to periumbilical area when it first started.  Pain has been constant and has progressed to right flank pain radiating to right lower quadrant.  Patient reports history of kidney stones and states this pain does not feel similar to that.  Denies any recent illness.  Denies any fever, chills, cough, congestion, chest pain, shortness of breath, nausea, vomiting, diarrhea, dysuria, hematuria.  Last bowel movement was this morning it was normal for him, nonbloody.  Denies any previous abdominal surgeries.  Has not taken anything for pain prior to coming in.      Prior to Admission Medications   Prescriptions Last Dose Informant Patient Reported? Taking?   albuterol (PROVENTIL HFA,VENTOLIN HFA) 90 mcg/act inhaler   No No   Sig: Inhale 2 puffs every 4 (four) hours as needed for wheezing or shortness of breath   guaifenesin-codeine (GUAIFENESIN AC) 100-10 MG/5ML liquid   No No   Si-2 tsp every 4 hours as needed for cough   meloxicam (MOBIC) 15 mg tablet   No No   Sig: TAKE 1 TABLET BY MOUTH DAILY AS NEEDED FOR MODERATE PAIN.      Facility-Administered Medications: None       Past Medical History:   Diagnosis Date    Chronic pain of left ankle 01/10/2024    Prediabetes 2023    Renal colic 08/28/2013    x 2       Past Surgical History:   Procedure Laterality Date    NE NEUROPLASTY &/TRANSPOS MEDIAN NRV CARPAL TUNNE Left 2023    Procedure: CTR , LEFT;  Surgeon: Sd Orellana MD;  Location: [facility];  Service: Orthopedics       Family History   Problem Relation Age of Onset    Diabetes Mother     Hypertension Father      I have reviewed and agree with the history as documented.    E-Cigarette/Vaping     E-Cigarette Use Never User      E-Cigarette/Vaping Substances    Nicotine No     THC No     CBD No     Flavoring No      Social History     Tobacco Use    Smoking status: Never    Smokeless tobacco: Never   Vaping Use    Vaping status: Never Used   Substance Use Topics    Alcohol use: Yes     Comment: socially    Drug use: Never        Review of Systems   Gastrointestinal:  Positive for abdominal pain.   Genitourinary:  Positive for flank pain.       Physical Exam  ED Triage Vitals   Temperature Pulse Respirations Blood Pressure SpO2   03/20/24 1009 03/20/24 1009 03/20/24 1009 03/20/24 1009 03/20/24 1009   97.8 °F (36.6 °C) 66 (!) 24 151/73 99 %      Temp Source Heart Rate Source Patient Position - Orthostatic VS BP Location FiO2 (%)   03/20/24 1009 03/20/24 1115 03/20/24 1009 03/20/24 1009 --   Oral Monitor Sitting Right arm       Pain Score       03/20/24 1009       9             Orthostatic Vital Signs  Vitals:    03/20/24 1327 03/20/24 1415 03/20/24 1445 03/20/24 1545   BP: 124/77 118/75 125/78 130/83   Pulse: 55 (!) 52 (!) 52 (!) 52   Patient Position - Orthostatic VS: Lying Lying Lying Lying       Physical Exam  Vitals and nursing note reviewed.   Constitutional:       General: He is in acute distress.      Appearance: Normal appearance. He is well-developed. He is obese. He is not ill-appearing, toxic-appearing or diaphoretic.      Comments: Mild acute distress 2/2 pain   HENT:      Head: Normocephalic and atraumatic.      Mouth/Throat:      Mouth: Mucous membranes are moist.      Pharynx: Oropharynx is clear.   Eyes:      Conjunctiva/sclera: Conjunctivae normal.   Cardiovascular:      Rate and Rhythm: Normal rate and regular rhythm.      Heart sounds: No murmur heard.  Pulmonary:      Effort: Pulmonary effort is normal. No respiratory distress.      Breath sounds: Normal breath sounds. No stridor. No wheezing, rhonchi or rales.   Abdominal:      General: There is no distension.      Palpations: Abdomen is  soft.      Tenderness: There is no abdominal tenderness. There is no right CVA tenderness or left CVA tenderness.   Musculoskeletal:         General: No swelling. Normal range of motion.      Cervical back: Normal range of motion and neck supple. No rigidity.   Skin:     General: Skin is warm and dry.      Capillary Refill: Capillary refill takes less than 2 seconds.   Neurological:      General: No focal deficit present.      Mental Status: He is alert and oriented to person, place, and time.   Psychiatric:         Mood and Affect: Mood normal.         ED Medications  Medications   sodium chloride 0.9 % bolus 1,000 mL (0 mL Intravenous Stopped 3/20/24 1210)   ketorolac (TORADOL) injection 15 mg (15 mg Intravenous Given 3/20/24 1105)   acetaminophen (TYLENOL) tablet 650 mg (650 mg Oral Given 3/20/24 1105)   HYDROmorphone (DILAUDID) injection 0.5 mg (0.5 mg Intravenous Given 3/20/24 1108)   iohexol (OMNIPAQUE) 350 MG/ML injection (SINGLE-DOSE) 100 mL (100 mL Intravenous Given 3/20/24 1150)       Diagnostic Studies  Results Reviewed       Procedure Component Value Units Date/Time    Comprehensive metabolic panel [[id number]]  (Abnormal) Collected: 03/20/24 1101    Lab Status: Final result Specimen: Blood from Arm, Right Updated: 03/20/24 1126     Sodium 138 mmol/L      Potassium 4.0 mmol/L      Chloride 104 mmol/L      CO2 28 mmol/L      ANION GAP 6 mmol/L      BUN 20 mg/dL      Creatinine 0.92 mg/dL      Glucose 146 mg/dL      Calcium 9.3 mg/dL      AST 23 U/L      ALT 31 U/L      Alkaline Phosphatase 56 U/L      Total Protein 7.3 g/dL      Albumin 4.3 g/dL      Total Bilirubin 0.52 mg/dL      eGFR 92 ml/min/1.73sq m     Narrative:      National Kidney Disease Foundation guidelines for Chronic Kidney Disease (CKD):     Stage 1 with normal or high GFR (GFR > 90 mL/min/1.73 square meters)    Stage 2 Mild CKD (GFR = 60-89 mL/min/1.73 square meters)    Stage 3A Moderate CKD (GFR = 45-59 mL/min/1.73 square meters)    Stage  3B Moderate CKD (GFR = 30-44 mL/min/1.73 square meters)    Stage 4 Severe CKD (GFR = 15-29 mL/min/1.73 square meters)    Stage 5 End Stage CKD (GFR <15 mL/min/1.73 square meters)  Note: GFR calculation is accurate only with a steady state creatinine    Lipase [[id number]]  (Normal) Collected: 03/20/24 1101    Lab Status: Final result Specimen: Blood from Arm, Right Updated: 03/20/24 1126     Lipase 18 u/L     CBC and differential [[id number]]  (Abnormal) Collected: 03/20/24 1101    Lab Status: Final result Specimen: Blood from Arm, Right Updated: 03/20/24 1106     WBC 10.03 Thousand/uL      RBC 4.64 Million/uL      Hemoglobin 14.5 g/dL      Hematocrit 43.4 %      MCV 94 fL      MCH 31.3 pg      MCHC 33.4 g/dL      RDW 13.4 %      MPV 11.3 fL      Platelets 287 Thousands/uL      nRBC 0 /100 WBCs      Neutrophils Relative 76 %      Immature Grans % 0 %      Lymphocytes Relative 18 %      Monocytes Relative 6 %      Eosinophils Relative 0 %      Basophils Relative 0 %      Neutrophils Absolute 7.44 Thousands/µL      Absolute Immature Grans 0.04 Thousand/uL      Absolute Lymphocytes 1.84 Thousands/µL      Absolute Monocytes 0.64 Thousand/µL      Eosinophils Absolute 0.03 Thousand/µL      Basophils Absolute 0.04 Thousands/µL     Urine Microscopic [[id number]]  (Abnormal) Collected: 03/20/24 1029    Lab Status: Final result Specimen: Urine, Clean Catch Updated: 03/20/24 1104     RBC, UA Innumerable /hpf      WBC, UA 2-4 /hpf      Epithelial Cells None Seen /hpf      Bacteria, UA None Seen /hpf      MUCUS THREADS Occasional    Urine Macroscopic, POC [[id number]]  (Abnormal) Collected: 03/20/24 1029    Lab Status: Final result Specimen: Urine Updated: 03/20/24 1030     Color, UA Yellow     Clarity, UA Cloudy     pH, UA 7.0     Leukocytes, UA Negative     Nitrite, UA Negative     Protein, UA Negative mg/dl      Glucose, UA Negative mg/dl      Ketones, UA Negative mg/dl      Urobilinogen, UA 0.2 E.U./dl      Bilirubin, UA  Negative     Occult Blood, UA Moderate     Specific Gravity, UA >=1.030    Narrative:      CLINITEK RESULT                   CTA dissection protocol chest/abdomen/pelvis   Final Result by Ari Still MD (03/20 1306)      No acute aortic syndrome.      Mildly obstructing 4 mm calculus in the right mid to distal ureter.      Hepatomegaly with fatty filtration, and several small hemangiomas.         Workstation performed: [id number]         US kidney and bladder    (Results Pending)         Procedures  Procedures      ED Course  ED Course as of 03/20/24 1716   Wed Mar 20, 2024   1107 WBC: 10.03  No leukocytosis    1107 Hemoglobin: 14.5  No anemia    1107 Platelet Count: 287  wnl   1107 Neutrophils %(!): 76   1107 Leukocytes, UA: Negative   1107 Nitrite, UA: Negative  No evidence of infection    1108 Blood, UA(!): Moderate  Possibly d/t kidney stone?     1108 WBC, UA(!): 2-4   1108 Bacteria, UA: None Seen   1108 RBC Urine(!): Innumerable   1132 LIPASE: 18  Doubt pancreatitis    1132 Creatinine: 0.92  No RICCARDO    1313 CTA dissection protocol chest/abdomen/pelvis  No acute aortic syndrome.     Mildly obstructing 4 mm calculus in the right mid to distal ureter.     Hepatomegaly with fatty filtration, and several small hemangiomas.     1314 TT sent to urology    1329 Patient reassessed, pain has resolved. Updated on labs and imaging findings as well as plan for urology eval   1429 Urology evaluated patient in ED. Okay to discharge home with outpatient follow up in 4 weeks.                              SBIRT 22yo+      Flowsheet Row Most Recent Value   Initial Alcohol Screen: US AUDIT-C     1. How often do you have a drink containing alcohol? 0 Filed at: 03/20/2024 1027   2. How many drinks containing alcohol do you have on a typical day you are drinking?  0 Filed at: 03/20/2024 1027   3a. Male UNDER 65: How often do you have five or more drinks on one occasion? 0 Filed at: 03/20/2024 1027   Audit-C Score 0 Filed at:  03/20/2024 1027   TAO: How many times in the past year have you...    Used an illegal drug or used a prescription medication for non-medical reasons? Never Filed at: 03/20/2024 1027                  Medical Decision Making  Amount and/or Complexity of Data Reviewed  Labs: ordered. Decision-making details documented in ED Course.  Radiology: ordered. Decision-making details documented in ED Course.    Risk  OTC drugs.  Prescription drug management.        ASSESSMENT: Patient is a 58 y.o. male who presents with right flank pain radiating to RLQ since this morning.   DDX includes but not limited to: aortic dissection vs kidney stone vs appendicitis vs pancreatitis vs cholecystitis vs enteritis vs obstruction vs perforation.   PLAN: CBC, CMP, lipase, UA, CT dissection study. Treated with toradol, tylenol, dilaudid, IVF.    See ED course for details.     Stable for discharge. Strict return to ED precautions provided.  Referral to urology provided.  Advised to follow up with urology as soon as able for re-evaluation and further management. Patient verbalized understanding and agrees with the plan of care.       Disposition  Final diagnoses:   Kidney stone   Right flank pain     Time reflects when diagnosis was documented in both MDM as applicable and the Disposition within this note       Time User Action Codes Description Comment    3/20/2024  2:29 PM Vanesa Reyes Add [N20.0] Kidney stone     3/20/2024  2:32 PM Vanesa Reyes Add [R10.9] Right flank pain           ED Disposition       ED Disposition   Discharge    Condition   Stable    Date/Time   Wed Mar 20, 2024 1432    Comment   Fernando Zuniga discharge to home/self care.                   Follow-up Information       Follow up With Specialties Details Why Contact Info Additional Information    Bobo Benz MD Family Medicine   [address] [place] [address] [phone]       [facility] Emergency Medicine  If  symptoms worsen [address] [facility] [address]  [phone] [facility] Emergency Department, [address] [facility], [place], [place], [address]            Discharge Medication List as of 3/20/2024  3:34 PM        START taking these medications    Details   ibuprofen (MOTRIN) 600 mg tablet Take 1 tablet (600 mg total) by mouth every 6 (six) hours as needed for mild pain, Starting Wed 3/20/2024, Normal      oxyCODONE (ROXICODONE) 5 immediate release tablet Take 1 tablet (5 mg total) by mouth every 8 (eight) hours as needed for severe pain for up to 10 days Max Daily Amount: 15 mg, Starting Wed 3/20/2024, Until Sat 3/30/2024 at 2359, Normal      tamsulosin (FLOMAX) 0.4 mg Take 1 capsule (0.4 mg total) by mouth daily at bedtime, Starting Wed 3/20/2024, Normal           CONTINUE these medications which have NOT CHANGED    Details   albuterol (PROVENTIL HFA,VENTOLIN HFA) 90 mcg/act inhaler Inhale 2 puffs every 4 (four) hours as needed for wheezing or shortness of breath, Starting Mon 2/5/2024, Normal      guaifenesin-codeine (GUAIFENESIN AC) 100-10 MG/5ML liquid 1-2 tsp every 4 hours as needed for cough, Normal      meloxicam (MOBIC) 15 mg tablet TAKE 1 TABLET BY MOUTH DAILY AS NEEDED FOR MODERATE PAIN., Starting Thu 2/29/2024, Normal           Outpatient Discharge Orders   US kidney and bladder   Standing Status: Future Standing Exp. Date: 03/20/28       PDMP Review         Value Time User    PDMP Reviewed  Yes 2/21/2024  8:31 AM Bobo Benz MD             ED Provider  Attending physically available and evaluated Fernando Zuniga. I managed the patient along with the ED Attending.    Electronically Signed by           Vanesa Reyes MD  03/20/24 9980

## 2024-03-21 NOTE — TELEPHONE ENCOUNTER
Called and spoke with Fernando. He was scheduled for follow up with MARQUITA Hubbard in the [place] office on 4/29/24. Office address provided. Patient aware he will need renal US 1-2 weeks prior to the visit and he was provided with central scheduling phone number to call and schedule imaging.

## 2024-04-11 ENCOUNTER — OFFICE VISIT (OUTPATIENT)
Dept: PODIATRY | Facility: CLINIC | Age: 59
End: 2024-04-11

## 2024-04-11 VITALS
BODY MASS INDEX: 29.73 KG/M2 | SYSTOLIC BLOOD PRESSURE: 137 MMHG | WEIGHT: 185 LBS | DIASTOLIC BLOOD PRESSURE: 88 MMHG | HEIGHT: 66 IN | HEART RATE: 60 BPM

## 2024-04-11 DIAGNOSIS — N20.0 KIDNEY STONE: ICD-10-CM

## 2024-04-11 DIAGNOSIS — M25.572 CHRONIC PAIN OF LEFT ANKLE: ICD-10-CM

## 2024-04-11 DIAGNOSIS — G89.29 CHRONIC PAIN OF LEFT ANKLE: ICD-10-CM

## 2024-04-11 DIAGNOSIS — M19.072 OSTEOARTHRITIS OF LEFT ANKLE OR FOOT: Primary | ICD-10-CM

## 2024-04-11 RX ORDER — TAMSULOSIN HYDROCHLORIDE 0.4 MG/1
0.4 CAPSULE ORAL
Qty: 90 CAPSULE | Refills: 1 | Status: SHIPPED | OUTPATIENT
Start: 2024-04-11

## 2024-04-11 RX ORDER — MELOXICAM 15 MG/1
15 TABLET ORAL DAILY PRN
Qty: 90 TABLET | Refills: 1 | Status: SHIPPED | OUTPATIENT
Start: 2024-04-11

## 2024-04-11 NOTE — PROGRESS NOTES
Assessment/Plan:   Discussed with patient treatment options for chronic arthritic pain from his rear foot (subtalar joint)  Patient declines an injection with corticosteroid.  Recommend a consistent course of anti-inflammatories to be initiated.  Continue with physical therapy.  Rx meloxicam 15 mg daily with food for 4-week duration.  Patient instructed on appropriate taking of this medication and that it should be taken consistently on a daily basis.  Ice and rest as needed.  Previous x-rays reviewed with patient in detail illustrating the degenerative arthritis and its location in the foot.  Patient requests FMLA paperwork be filled out since he cannot stand and walk for more than 6 hours at a time daily due to the pain and discomfort that he experiences.  Recommend patient limit his working to 6 hours a day maximum with time to rest as needed  Discussed possible fabrication of [place] brace to restrict his motion and/or possibly consider wearing a cam boot.  Follow-up in 6 weeks.       Diagnoses and all orders for this visit:    Osteoarthritis of left ankle or foot  -     meloxicam (MOBIC) 15 mg tablet; Take 1 tablet (15 mg total) by mouth daily as needed for moderate pain    Chronic pain of left ankle  -     meloxicam (MOBIC) 15 mg tablet; Take 1 tablet (15 mg total) by mouth daily as needed for moderate pain          Subjective:     Patient ID: Fernando Zuniga is a 58 y.o. male.    4/11/2024: 58-year-old male seen today for follow-up reports continued pain from his left ankle.  Reports anti-inflammatories did help but did not take them consistently.  Patient reports he took them when he felt he needed them for pain.  Physical therapy helped some but still has fairly consistent pain on a daily basis.    2/5/2024: 58-year-old male presents with chief complaint painful left ankle which he believes is related to an ankle fracture he sustained 15 to 20 years ago.  Now has pain every day.  Reports having ankle x-ray  beginning of January with Ortho consult and then was referred to podiatry for continued care.        Review of Systems   Constitutional: Negative.    HENT: Negative.     Eyes: Negative.    Respiratory: Negative.     Cardiovascular: Negative.    Gastrointestinal: Negative.    Endocrine: Negative.    Genitourinary: Negative.    Musculoskeletal:  Positive for arthralgias.   Skin: Negative.    Allergic/Immunologic: Negative.    Neurological: Negative.    Hematological: Negative.    Psychiatric/Behavioral: Negative.           Objective:     Physical Exam  Constitutional:       Appearance: Normal appearance.   HENT:      Head: Normocephalic.      Right Ear: Tympanic membrane normal.      Left Ear: Tympanic membrane normal.      Nose: No congestion.      Mouth/Throat:      Pharynx: No oropharyngeal exudate or posterior oropharyngeal erythema.   Eyes:      Conjunctiva/sclera: Conjunctivae normal.      Pupils: Pupils are equal, round, and reactive to light.   Cardiovascular:      Rate and Rhythm: Normal rate and regular rhythm.      Pulses: Normal pulses.   Pulmonary:      Effort: Pulmonary effort is normal.   Musculoskeletal:         General: Tenderness present.      Left foot: Decreased range of motion.      Comments: Left rear foot diminished range of motion subtalar joint.  Moderate pain with palpation medial and lateral aspects of posterior facet subtalar joint.  Sinus tarsi region also quite tender to palpation.   Feet:      Right foot:      Protective Sensation: 10 sites tested.  10 sites sensed.      Left foot:      Protective Sensation: 10 sites tested.  10 sites sensed.   Skin:     General: Skin is warm and dry.      Capillary Refill: Capillary refill takes 2 to 3 seconds.      Coloration: Skin is not pale.      Findings: No bruising, erythema, lesion or rash.   Neurological:      General: No focal deficit present.      Mental Status: He is alert.      Cranial Nerves: No cranial nerve deficit.      Sensory: No  sensory deficit.      Motor: No weakness.      Gait: Gait normal.      Deep Tendon Reflexes: Reflexes normal.   Psychiatric:         Mood and Affect: Mood normal.         Behavior: Behavior normal.         Judgment: Judgment normal.

## 2024-05-07 ENCOUNTER — TELEPHONE (OUTPATIENT)
Age: 59
End: 2024-05-07

## 2024-05-07 NOTE — TELEPHONE ENCOUNTER
Caller: Fernando Zuniga    Doctor and/or Office: Dr. Tran/Chip    #: [phone]    [place]: Disability forms/Just got message from job that they never Rec'd completed FMLA forms. Checked spread sheet and it says completed 4/17/24. With further investigation I see it is to be picked up by patient at office. Form states to only be picked up by pt/no faxing. He was never called to let him know this was completed, so will come to office this week to . If any issues please call him back at number above.  Thanks

## 2024-05-31 ENCOUNTER — OFFICE VISIT (OUTPATIENT)
Dept: PODIATRY | Facility: CLINIC | Age: 59
End: 2024-05-31
Payer: COMMERCIAL

## 2024-05-31 VITALS
WEIGHT: 185 LBS | DIASTOLIC BLOOD PRESSURE: 90 MMHG | SYSTOLIC BLOOD PRESSURE: 147 MMHG | HEIGHT: 66 IN | HEART RATE: 79 BPM | BODY MASS INDEX: 29.73 KG/M2

## 2024-05-31 DIAGNOSIS — M25.572 CHRONIC PAIN OF LEFT ANKLE: ICD-10-CM

## 2024-05-31 DIAGNOSIS — M19.072 OSTEOARTHRITIS OF LEFT ANKLE OR FOOT: Primary | ICD-10-CM

## 2024-05-31 DIAGNOSIS — G89.29 CHRONIC PAIN OF LEFT ANKLE: ICD-10-CM

## 2024-05-31 PROCEDURE — 20600 DRAIN/INJ JOINT/BURSA W/O US: CPT | Performed by: PODIATRIST

## 2024-05-31 RX ORDER — LIDOCAINE HYDROCHLORIDE 10 MG/ML
1 INJECTION, SOLUTION INFILTRATION; PERINEURAL ONCE
Status: COMPLETED | OUTPATIENT
Start: 2024-05-31 | End: 2024-05-31

## 2024-05-31 RX ORDER — MELOXICAM 15 MG/1
15 TABLET ORAL DAILY PRN
Qty: 90 TABLET | Refills: 1 | Status: SHIPPED | OUTPATIENT
Start: 2024-05-31

## 2024-05-31 RX ORDER — BUPIVACAINE HYDROCHLORIDE 5 MG/ML
1 INJECTION, SOLUTION PERINEURAL ONCE
Status: COMPLETED | OUTPATIENT
Start: 2024-05-31 | End: 2024-05-31

## 2024-05-31 RX ORDER — DEXAMETHASONE SODIUM PHOSPHATE 4 MG/ML
2 INJECTION, SOLUTION INTRA-ARTICULAR; INTRALESIONAL; INTRAMUSCULAR; INTRAVENOUS; SOFT TISSUE ONCE
Status: COMPLETED | OUTPATIENT
Start: 2024-05-31 | End: 2024-05-31

## 2024-05-31 RX ORDER — TRIAMCINOLONE ACETONIDE 40 MG/ML
10 INJECTION, SUSPENSION INTRA-ARTICULAR; INTRAMUSCULAR ONCE
Status: COMPLETED | OUTPATIENT
Start: 2024-05-31 | End: 2024-05-31

## 2024-05-31 RX ADMIN — DEXAMETHASONE SODIUM PHOSPHATE 2 MG: 4 INJECTION, SOLUTION INTRA-ARTICULAR; INTRALESIONAL; INTRAMUSCULAR; INTRAVENOUS; SOFT TISSUE at 15:31

## 2024-05-31 RX ADMIN — TRIAMCINOLONE ACETONIDE 10 MG: 40 INJECTION, SUSPENSION INTRA-ARTICULAR; INTRAMUSCULAR at 15:32

## 2024-05-31 RX ADMIN — LIDOCAINE HYDROCHLORIDE 1 ML: 10 INJECTION, SOLUTION INFILTRATION; PERINEURAL at 15:32

## 2024-05-31 RX ADMIN — BUPIVACAINE HYDROCHLORIDE 1 ML: 5 INJECTION, SOLUTION PERINEURAL at 15:31

## 2024-05-31 NOTE — PROGRESS NOTES
"Assessment/Plan:   Further treatment options reviewed with patient and he requests proceeding with the injection of corticosteroid since the anti-inflammatories have not been able to provide him with any adequate relief.  Another option which is nonsurgical would be an [place] brace to restrict motion at the ankle.  Injection with corticosteroid as noted below left subtalar joint  Rx meloxicam 15 mg daily with food sent to pharmacy.  Follow-up in approximately 6 weeks.       Diagnoses and all orders for this visit:    Osteoarthritis of left ankle or foot  -     meloxicam (MOBIC) 15 mg tablet; Take 1 tablet (15 mg total) by mouth daily as needed for moderate pain  -     Small joint arthrocentesis: L subtalar  -     dexamethasone (DECADRON) injection 2 mg  -     triamcinolone acetonide (KENALOG-40) 40 mg/mL injection 10 mg  -     bupivacaine (MARCAINE) 0.5 % injection 1 mL  -     lidocaine (XYLOCAINE) 1 % injection 1 mL    Chronic pain of left ankle  -     meloxicam (MOBIC) 15 mg tablet; Take 1 tablet (15 mg total) by mouth daily as needed for moderate pain        Small joint arthrocentesis: L subtalar  Universal Protocol:  Consent: Verbal consent obtained.  Risks and benefits: risks, benefits and alternatives were discussed  Consent given by: patient  Time out: Immediately prior to procedure a \"time out\" was called to verify the correct patient, procedure, equipment, support staff and site/side marked as required.  Patient understanding: patient states understanding of the procedure being performed  Patient identity confirmed: verbally with patient  Supporting Documentation  Indications: pain and joint swelling   Procedure Details  Location: foot - L subtalar  Preparation: Patient was prepped and draped in the usual sterile fashion  Needle size: 25 G  Ultrasound guidance: no  Approach: lateral    Patient tolerance: patient tolerated the procedure well with no immediate complications  Dressing:  Sterile dressing " applied              Subjective:     Patient ID: Fernando Zuniga is a 58 y.o. male.    5/31/2024: 58-year-old male seen today for follow-up painful left ankle.  Reports pain and discomfort are no better overall with only a slight improvement with the meloxicam.    4/11/2024: 58-year-old male seen today for follow-up reports continued pain from his left ankle.  Reports anti-inflammatories did help but did not take them consistently.  Patient reports he took them when he felt he needed them for pain.  Physical therapy helped some but still has fairly consistent pain on a daily basis.    2/5/2024: 58-year-old male presents with chief complaint painful left ankle which he believes is related to an ankle fracture he sustained 15 to 20 years ago.  Now has pain every day.  Reports having ankle x-ray beginning of January with Ortho consult and then was referred to podiatry for continued care.        Review of Systems   Constitutional: Negative.    HENT: Negative.     Eyes: Negative.    Respiratory: Negative.     Cardiovascular: Negative.    Gastrointestinal: Negative.    Endocrine: Negative.    Genitourinary: Negative.    Musculoskeletal:  Positive for arthralgias.   Skin: Negative.    Allergic/Immunologic: Negative.    Neurological: Negative.    Hematological: Negative.    Psychiatric/Behavioral: Negative.           Objective:     Physical Exam  Constitutional:       Appearance: Normal appearance.   HENT:      Head: Normocephalic.      Right Ear: Tympanic membrane normal.      Left Ear: Tympanic membrane normal.      Nose: No congestion.      Mouth/Throat:      Pharynx: No oropharyngeal exudate or posterior oropharyngeal erythema.   Eyes:      Conjunctiva/sclera: Conjunctivae normal.      Pupils: Pupils are equal, round, and reactive to light.   Cardiovascular:      Rate and Rhythm: Normal rate and regular rhythm.      Pulses: Normal pulses.   Pulmonary:      Effort: Pulmonary effort is normal.   Musculoskeletal:          General: Tenderness present.      Left foot: Decreased range of motion.      Comments: Left rear foot diminished range of motion subtalar joint.  Moderate pain with palpation medial and lateral aspects of posterior facet subtalar joint.  Sinus tarsi region also quite tender to palpation.   Feet:      Right foot:      Protective Sensation: 10 sites tested.  10 sites sensed.      Left foot:      Protective Sensation: 10 sites tested.  10 sites sensed.   Skin:     General: Skin is warm and dry.      Capillary Refill: Capillary refill takes 2 to 3 seconds.      Coloration: Skin is not pale.      Findings: No bruising, erythema, lesion or rash.   Neurological:      General: No focal deficit present.      Mental Status: He is alert.      Cranial Nerves: No cranial nerve deficit.      Sensory: No sensory deficit.      Motor: No weakness.      Gait: Gait normal.      Deep Tendon Reflexes: Reflexes normal.   Psychiatric:         Mood and Affect: Mood normal.         Behavior: Behavior normal.         Judgment: Judgment normal.

## 2024-07-08 ENCOUNTER — OFFICE VISIT (OUTPATIENT)
Dept: FAMILY MEDICINE CLINIC | Facility: CLINIC | Age: 59
End: 2024-07-08
Payer: COMMERCIAL

## 2024-07-08 VITALS
TEMPERATURE: 97.9 F | HEIGHT: 66 IN | HEART RATE: 71 BPM | DIASTOLIC BLOOD PRESSURE: 78 MMHG | SYSTOLIC BLOOD PRESSURE: 130 MMHG | WEIGHT: 186 LBS | OXYGEN SATURATION: 98 % | BODY MASS INDEX: 29.89 KG/M2

## 2024-07-08 DIAGNOSIS — N23 RENAL COLIC ON RIGHT SIDE: Primary | ICD-10-CM

## 2024-07-08 LAB
SL AMB  POCT GLUCOSE, UA: NEGATIVE
SL AMB LEUKOCYTE ESTERASE,UA: NEGATIVE
SL AMB POCT BILIRUBIN,UA: NEGATIVE
SL AMB POCT BLOOD,UA: ABNORMAL
SL AMB POCT CLARITY,UA: ABNORMAL
SL AMB POCT COLOR,UA: ABNORMAL
SL AMB POCT KETONES,UA: NEGATIVE
SL AMB POCT NITRITE,UA: NEGATIVE
SL AMB POCT PH,UA: 6
SL AMB POCT SPECIFIC GRAVITY,UA: 1.02
SL AMB POCT URINE PROTEIN: ABNORMAL
SL AMB POCT UROBILINOGEN: 0.2

## 2024-07-08 PROCEDURE — 81002 URINALYSIS NONAUTO W/O SCOPE: CPT | Performed by: FAMILY MEDICINE

## 2024-07-08 PROCEDURE — 99213 OFFICE O/P EST LOW 20 MIN: CPT | Performed by: FAMILY MEDICINE

## 2024-07-08 NOTE — PATIENT INSTRUCTIONS
Fortunately, passed the kidney stone on its own.  Will do stone analysis.  Most stones are calcium.  Return for annual physical in August..

## 2024-07-08 NOTE — PROGRESS NOTES
Chief Complaint   Patient presents with    Follow-up     Passed a kidney stone 3 days ago        HPI   Here because he passed a kidney stone about 3 days ago.  Was seen in hospital while vacationing in [place].  Pain present for 3 days before he went to the hospital.  Started in the right flank and then radiated to the right groin.  No studies were done in the hospital.  He was continued on tamsulosin and passed the stone 3 days ago.    Past Medical History:   Diagnosis Date    Chronic pain of left ankle 01/10/2024    Prediabetes 2023    Renal colic 08/28/2013    x 3.  Most recent stone 2024        Past Surgical History:   Procedure Laterality Date    UT NEUROPLASTY &/TRANSPOS MEDIAN NRV CARPAL TUNNE Left 2023    Procedure: CTR , LEFT;  Surgeon: Sd Orellana MD;  Location: [facility];  Service: Orthopedics       Social History     Tobacco Use    Smoking status: Never    Smokeless tobacco: Never   Substance Use Topics    Alcohol use: Yes     Comment: socially       Social History     Social History Narrative     since 89.      Is a .     Wife worked as a registrar at [employer] .Quit  when her father became ill.     5 kids.     Oldest, Aaron 34, Working for [employer].  Lives in [place].     Addie 35, . Teacher. Grad of [employer]. Lives in [place]  2 daughters.  is a .    Alexia 33. Grad of [employer]. [employer]. Lives in [place].    Fernando going to [employer] .    Kaityarilda 12. 7TH grade.         Enjoys bike riding, camping, fishing, boating.        Father  at 74 from Covid in 3/20.        The following portions of the patient's history were reviewed and updated as appropriate: allergies, current medications, past family history, past medical history, past social history, past surgical history, and problem list.      Review of Systems       /78 (BP Location: Left arm, Patient Position: Sitting, Cuff Size: Standard)    "Pulse 71   Temp 97.9 °F (36.6 °C) (Temporal)   Ht 5' 6\" (1.676 m)   Wt 84.4 kg (186 lb)   SpO2 98%   BMI 30.02 kg/m²      Physical Exam   Appears well.  No flank tenderness.  Abdomen soft and nontender.  Small blood noted in dipstick urine.              Current Outpatient Medications:     albuterol (PROVENTIL HFA,VENTOLIN HFA) 90 mcg/act inhaler, Inhale 2 puffs every 4 (four) hours as needed for wheezing or shortness of breath, Disp: 18 g, Rfl: 5    ibuprofen (MOTRIN) 600 mg tablet, Take 1 tablet (600 mg total) by mouth every 6 (six) hours as needed for mild pain, Disp: 40 tablet, Rfl: 0    meloxicam (MOBIC) 15 mg tablet, Take 1 tablet (15 mg total) by mouth daily as needed for moderate pain, Disp: 90 tablet, Rfl: 1    tamsulosin (FLOMAX) 0.4 mg, TAKE 1 CAPSULE BY MOUTH AT BEDTIME, Disp: 90 capsule, Rfl: 1     No problem-specific Assessment & Plan notes found for this encounter.       Diagnoses and all orders for this visit:    Renal colic on right side  -     Stone analysis; Future        Patient Instructions   Fortunately, passed the kidney stone on its own.  Will do stone analysis.  Most stones are calcium.  Return for annual physical in August..   "

## 2024-07-12 ENCOUNTER — OFFICE VISIT (OUTPATIENT)
Dept: PODIATRY | Facility: CLINIC | Age: 59
End: 2024-07-12
Payer: COMMERCIAL

## 2024-07-12 VITALS
DIASTOLIC BLOOD PRESSURE: 82 MMHG | BODY MASS INDEX: 29.83 KG/M2 | HEIGHT: 66 IN | WEIGHT: 185.6 LBS | SYSTOLIC BLOOD PRESSURE: 126 MMHG

## 2024-07-12 DIAGNOSIS — M25.572 CHRONIC PAIN OF LEFT ANKLE: ICD-10-CM

## 2024-07-12 DIAGNOSIS — G89.29 CHRONIC PAIN OF LEFT ANKLE: ICD-10-CM

## 2024-07-12 DIAGNOSIS — M19.072 OSTEOARTHRITIS OF LEFT ANKLE OR FOOT: Primary | ICD-10-CM

## 2024-07-12 PROCEDURE — 99213 OFFICE O/P EST LOW 20 MIN: CPT | Performed by: PODIATRIST

## 2024-07-12 RX ORDER — MELOXICAM 15 MG/1
15 TABLET ORAL DAILY PRN
Qty: 90 TABLET | Refills: 1 | Status: SHIPPED | OUTPATIENT
Start: 2024-07-12

## 2024-07-12 NOTE — PROGRESS NOTES
Assessment/Plan:   Recommend patient continue with anti-inflammatories and range of motion/stretching exercises.  Reports that his work restrictions limiting his walking and standing have helped substantially.  Rx meloxicam 15 mg once a day.  Declines any additional injection at this time since pain relief was moderate with last visits injection.  Follow-up in 3 months.       Diagnoses and all orders for this visit:    Osteoarthritis of left ankle or foot  -     Posterior facet left subtalar joint  - Rx (MOBIC) 15 mg tablet; Take 1 tablet (15 mg total) by mouth daily as needed for moderate pain    Chronic pain of left ankle  -     Rx (MOBIC) 15 mg tablet; Take 1 tablet (15 mg total) by mouth daily as needed for moderate pain          Subjective:     Patient ID: Fernando Zuniga is a 59 y.o. male.    7/12/2024: 59-year-old male seen today for follow-up painful left rear foot reporting a 50-60% improvement overall with last visits injection.  Reports that this in conjunction with less walking at work have helped dramatically with his overall pain and discomfort.    5/31/2024: 58-year-old male seen today for follow-up painful left ankle.  Reports pain and discomfort are no better overall with only a slight improvement with the meloxicam.    4/11/2024: 58-year-old male seen today for follow-up reports continued pain from his left ankle.  Reports anti-inflammatories did help but did not take them consistently.  Patient reports he took them when he felt he needed them for pain.  Physical therapy helped some but still has fairly consistent pain on a daily basis.    2/5/2024: 58-year-old male presents with chief complaint painful left ankle which he believes is related to an ankle fracture he sustained 15 to 20 years ago.  Now has pain every day.  Reports having ankle x-ray beginning of January with Ortho consult and then was referred to podiatry for continued care.        Review of Systems   Constitutional: Negative.    HENT:  Negative.     Eyes: Negative.    Respiratory: Negative.     Cardiovascular: Negative.    Gastrointestinal: Negative.    Endocrine: Negative.    Genitourinary: Negative.    Musculoskeletal:  Positive for arthralgias.        Left rear foot diminished moderately   Skin: Negative.    Allergic/Immunologic: Negative.    Neurological: Negative.    Hematological: Negative.    Psychiatric/Behavioral: Negative.           Objective:     Physical Exam  Constitutional:       Appearance: Normal appearance.   HENT:      Head: Normocephalic.      Right Ear: Tympanic membrane normal.      Left Ear: Tympanic membrane normal.      Nose: No congestion.      Mouth/Throat:      Pharynx: No oropharyngeal exudate or posterior oropharyngeal erythema.   Eyes:      Conjunctiva/sclera: Conjunctivae normal.      Pupils: Pupils are equal, round, and reactive to light.   Cardiovascular:      Rate and Rhythm: Normal rate and regular rhythm.      Pulses:           Dorsalis pedis pulses are 1+ on the right side and 1+ on the left side.        Posterior tibial pulses are 1+ on the right side and 1+ on the left side.   Pulmonary:      Effort: Pulmonary effort is normal.   Musculoskeletal:         General: Swelling and tenderness present.      Left foot: Decreased range of motion.      Comments: Mild tenderness with palpation posterior facet of subtalar joint laterally.  Diminished range of motion left subtalar joint noted.  Overall improved compared to last visit.   Feet:      Right foot:      Protective Sensation: 10 sites tested.        Left foot:      Protective Sensation: 10 sites tested.     Skin:     General: Skin is warm and dry.      Capillary Refill: Capillary refill takes 2 to 3 seconds.      Coloration: Skin is not pale.      Findings: No bruising, erythema, lesion or rash.   Neurological:      General: No focal deficit present.      Mental Status: He is alert.      Cranial Nerves: No cranial nerve deficit.      Sensory: No sensory deficit.       Motor: No weakness.      Gait: Gait normal.      Deep Tendon Reflexes: Reflexes normal.   Psychiatric:         Mood and Affect: Mood normal.         Behavior: Behavior normal.         Judgment: Judgment normal.

## 2024-08-12 ENCOUNTER — OFFICE VISIT (OUTPATIENT)
Dept: FAMILY MEDICINE CLINIC | Facility: CLINIC | Age: 59
End: 2024-08-12
Payer: COMMERCIAL

## 2024-08-12 VITALS
SYSTOLIC BLOOD PRESSURE: 130 MMHG | DIASTOLIC BLOOD PRESSURE: 88 MMHG | BODY MASS INDEX: 30.05 KG/M2 | HEIGHT: 66 IN | OXYGEN SATURATION: 98 % | HEART RATE: 72 BPM | WEIGHT: 187 LBS | TEMPERATURE: 97.8 F

## 2024-08-12 DIAGNOSIS — Z12.5 SCREENING FOR PROSTATE CANCER: ICD-10-CM

## 2024-08-12 DIAGNOSIS — Z00.00 HEALTH MAINTENANCE EXAMINATION: Primary | ICD-10-CM

## 2024-08-12 DIAGNOSIS — R73.03 PREDIABETES: ICD-10-CM

## 2024-08-12 DIAGNOSIS — G89.29 CHRONIC PAIN OF LEFT ANKLE: ICD-10-CM

## 2024-08-12 DIAGNOSIS — M25.572 CHRONIC PAIN OF LEFT ANKLE: ICD-10-CM

## 2024-08-12 PROCEDURE — 99396 PREV VISIT EST AGE 40-64: CPT | Performed by: FAMILY MEDICINE

## 2024-08-12 NOTE — PROGRESS NOTES
Chief Complaint   Patient presents with    Physical Exam     PE        HPI   Here for annual physical exam.  Continues on meloxicam for his left ankle but his walking has been restricted which has helped a lot.  Does do a lot of standing.  No longer on Flomax which was used when he had a kidney stone.  Colonoscopy not due until .  1 year ago, A1c 6.0.      Past Medical History:   Diagnosis Date    Chronic pain of left ankle 01/10/2024    Prediabetes 2023    Renal colic 08/28/2013    x 3.  Most recent stone 2024        Past Surgical History:   Procedure Laterality Date    [place] NEUROPLASTY &/TRANSPOS MEDIAN NRV CARPAL TUNNE Left 2023    Procedure: CTR , LEFT;  Surgeon: Sd Orellana MD;  [facility];  Service: Orthopedics       Social History     Tobacco Use    Smoking status: Never    Smokeless tobacco: Never   Substance Use Topics    Alcohol use: Yes     Comment: socially       Social History     Social History Narrative     since 89.      Is a .     Wife worked as a registrar at [employer] .Quit  when her father became ill.     5 kids.     Oldest, Aaron 34, Working for [employer].  Lives in [place].     Addie 35, . Teacher. Grad of [employer]. Lives in [place]  2 daughters.  is a .    Alexia 33. Grad of [employer]. [employer]. Lives in [place].    Fernando going to [employer] .    Kaityarilda 12. 7TH grade.         Enjoys bike riding, camping, fishing, boating.        Father  at 74 from Covid in 3/20.        The following portions of the patient's history were reviewed and updated as appropriate: allergies, current medications, past family history, past medical history, past social history, past surgical history, and problem list.      Review of Systems   Constitutional:  Negative for appetite change.   Respiratory:  Negative for shortness of breath.    Cardiovascular:  Negative for chest pain.          /88 (BP Location:  "Left arm, Patient Position: Sitting, Cuff Size: Large)   Pulse 72   Temp 97.8 °F (36.6 °C) (Tympanic)   Ht 5' 6\" (1.676 m)   Wt 84.8 kg (187 lb)   SpO2 98%   BMI 30.18 kg/m²      Physical Exam   Healthy appearing individual in no acute distress.  Extraocular motions are intact.  Both ear drums are white.  Hearing is grossly intact.  Throat reveals no erythema.  Teeth are in good repair.  No neck nodes or thyromegaly.  Lungs are clear.  Heart regular with no murmurs or gallops.  Abdomen is soft and nontender.  No leg edema.  Skin reveals no apparent rash.  Neurologic grossly within normal limits.  Normal mood and affect.  Musculoskeletal exam grossly within normal limits.                Current Outpatient Medications:     meloxicam (MOBIC) 15 mg tablet, Take 1 tablet (15 mg total) by mouth daily as needed for moderate pain, Disp: 90 tablet, Rfl: 1     No problem-specific Assessment & Plan notes found for this encounter.       Diagnoses and all orders for this visit:    Health maintenance examination    Prediabetes  -     Hemoglobin A1C; Future  -     Lipid panel; Future    Chronic pain of left ankle    Screening for prostate cancer  -     PSA, Total Screen; Future        Patient Instructions   Health maintenance is performed.  Colonoscopy up-to-date.  To screen for prostate cancer, will do PSA.  Also check A1c for prediabetes.  Continue meloxicam as needed for ankle pain.  Recheck 1 year or as needed.   "

## 2024-08-12 NOTE — PATIENT INSTRUCTIONS
Health maintenance is performed.  Colonoscopy up-to-date.  To screen for prostate cancer, will do PSA.  Also check A1c for prediabetes.  Continue meloxicam as needed for ankle pain.  Recheck 1 year or as needed.

## 2024-08-14 ENCOUNTER — LAB (OUTPATIENT)
Dept: LAB | Facility: CLINIC | Age: 59
End: 2024-08-14
Payer: COMMERCIAL

## 2024-08-14 DIAGNOSIS — Z12.5 SCREENING FOR PROSTATE CANCER: ICD-10-CM

## 2024-08-14 DIAGNOSIS — R73.03 PREDIABETES: ICD-10-CM

## 2024-08-14 LAB
CHOLEST SERPL-MCNC: 157 MG/DL
EST. AVERAGE GLUCOSE BLD GHB EST-MCNC: 131 MG/DL
HBA1C MFR BLD: 6.2 %
HDLC SERPL-MCNC: 35 MG/DL
LDLC SERPL CALC-MCNC: 85 MG/DL (ref 0–100)
NONHDLC SERPL-MCNC: 122 MG/DL
PSA SERPL-MCNC: 0.43 NG/ML (ref 0–4)
TRIGL SERPL-MCNC: 184 MG/DL

## 2024-08-14 PROCEDURE — 80061 LIPID PANEL: CPT

## 2024-08-14 PROCEDURE — 83036 HEMOGLOBIN GLYCOSYLATED A1C: CPT

## 2024-08-14 PROCEDURE — G0103 PSA SCREENING: HCPCS

## 2024-08-14 PROCEDURE — 36415 COLL VENOUS BLD VENIPUNCTURE: CPT

## 2024-08-20 ENCOUNTER — OFFICE VISIT (OUTPATIENT)
Dept: FAMILY MEDICINE CLINIC | Facility: CLINIC | Age: 59
End: 2024-08-20
Payer: COMMERCIAL

## 2024-08-20 VITALS
OXYGEN SATURATION: 96 % | WEIGHT: 187 LBS | BODY MASS INDEX: 30.05 KG/M2 | RESPIRATION RATE: 16 BRPM | DIASTOLIC BLOOD PRESSURE: 80 MMHG | TEMPERATURE: 97.8 F | HEIGHT: 66 IN | HEART RATE: 60 BPM | SYSTOLIC BLOOD PRESSURE: 130 MMHG
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
## 2024-08-20 DIAGNOSIS — S16.1XXA STRAIN OF NECK MUSCLE, INITIAL ENCOUNTER: ICD-10-CM

## 2024-08-20 PROCEDURE — 99214 OFFICE O/P EST MOD 30 MIN: CPT | Performed by: FAMILY MEDICINE
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
## 2024-08-20 NOTE — PATIENT INSTRUCTIONS
Fortunately, no severe injury.  Suggest Tylenol and ibuprofen.  Note given to return to work in  6 days.  Follow-up if symptoms worsen.

## 2024-10-04 ENCOUNTER — OFFICE VISIT (OUTPATIENT)
Dept: PODIATRY | Facility: CLINIC | Age: 59
End: 2024-10-04
Payer: COMMERCIAL

## 2024-10-04 VITALS
BODY MASS INDEX: 29.89 KG/M2 | HEIGHT: 66 IN | WEIGHT: 186 LBS | HEART RATE: 63 BPM | DIASTOLIC BLOOD PRESSURE: 75 MMHG | SYSTOLIC BLOOD PRESSURE: 119 MMHG

## 2024-10-04 DIAGNOSIS — M25.572 CHRONIC PAIN OF LEFT ANKLE: ICD-10-CM

## 2024-10-04 DIAGNOSIS — G89.29 CHRONIC PAIN OF LEFT ANKLE: ICD-10-CM

## 2024-10-04 DIAGNOSIS — M19.072 OSTEOARTHRITIS OF LEFT ANKLE OR FOOT: Primary | ICD-10-CM

## 2024-10-04 PROCEDURE — 99213 OFFICE O/P EST LOW 20 MIN: CPT | Performed by: PODIATRIST

## 2024-10-04 RX ORDER — MELOXICAM 15 MG/1
15 TABLET ORAL DAILY PRN
Qty: 90 TABLET | Refills: 1 | Status: SHIPPED | OUTPATIENT
Start: 2024-10-04

## 2024-10-04 NOTE — PROGRESS NOTES
Assessment/Plan:   Overall clinically improving and stable with no exacerbations of pain/discomfort over the past couple weeks.  Patient is pleased with progress.  Recommend patient continue with anti-inflammatories and range of motion/stretching exercises.  Reports that his work restrictions limiting his walking and standing have helped substantially and he anticipates a position change at work which will reduce his walking requirements sometime he figures after January.  Rx meloxicam 15 mg once a day.  Declines any additional injection at this time since pain relief was moderate with last visits injection.  Follow-up in 3 months     Diagnoses and all orders for this visit:    Osteoarthritis of left ankle or foot    Chronic pain of left ankle          Subjective:     Patient ID: Fernando Zuniga is a 59 y.o. male.    10/4/2024: 59-year-old male  seen today for follow-up evaluation of chronic rear foot arthritis.  Reports he is doing well with no recent exacerbations of pain and discomfort.  Anti-inflammatories seem to be adequately managing the chronic pain.  Reports he will be getting a new job as of January which will have less walking requirements on his feet.    7/12/2024: 59-year-old male seen today for follow-up painful left rear foot reporting a 50-60% improvement overall with last visits injection.  Reports that this in conjunction with less walking at work have helped dramatically with his overall pain and discomfort.    5/31/2024: 58-year-old male seen today for follow-up painful left ankle.  Reports pain and discomfort are no better overall with only a slight improvement with the meloxicam.    4/11/2024: 58-year-old male seen today for follow-up reports continued pain from his left ankle.  Reports anti-inflammatories did help but did not take them consistently.  Patient reports he took them when he felt he needed them for pain.  Physical therapy helped some but still has fairly consistent pain  on a daily basis.    2/5/2024: 58-year-old male presents with chief complaint painful left ankle which he believes is related to an ankle fracture he sustained 15 to 20 years ago.  Now has pain every day.  Reports having ankle x-ray beginning of January with Ortho consult and then was referred to podiatry for continued care.        Review of Systems   Constitutional: Negative.    HENT: Negative.     Eyes: Negative.    Respiratory: Negative.     Cardiovascular: Negative.    Gastrointestinal: Negative.    Endocrine: Negative.    Genitourinary: Negative.    Musculoskeletal:  Positive for arthralgias.        Diminished range of motion left rear foot   Skin: Negative.    Allergic/Immunologic: Negative.    Neurological: Negative.    Hematological: Negative.    Psychiatric/Behavioral: Negative.           Objective:     Physical Exam  Constitutional:       Appearance: Normal appearance.   HENT:      Head: Normocephalic.      Right Ear: Tympanic membrane normal.      Left Ear: Tympanic membrane normal.      Nose: No congestion.      Mouth/Throat:      Pharynx: No oropharyngeal exudate or posterior oropharyngeal erythema.   Eyes:      Conjunctiva/sclera: Conjunctivae normal.      Pupils: Pupils are equal, round, and reactive to light.   Cardiovascular:      Rate and Rhythm: Normal rate and regular rhythm.      Pulses: Normal pulses.   Pulmonary:      Effort: Pulmonary effort is normal.   Musculoskeletal:         General: Swelling and tenderness present.      Left foot: Decreased range of motion.      Comments: Diminished range of motion left subtalar joint   Feet:      Right foot:      Protective Sensation: 10 sites tested.  10 sites sensed.      Skin integrity: Skin integrity normal.      Left foot:      Protective Sensation: 10 sites tested.  10 sites sensed.      Skin integrity: Skin integrity normal.   Skin:     General: Skin is warm and dry.      Capillary Refill: Capillary refill takes 2 to 3 seconds.      Coloration:  Skin is not pale.      Findings: No bruising, erythema, lesion or rash.   Neurological:      General: No focal deficit present.      Mental Status: He is alert.      Cranial Nerves: No cranial nerve deficit.      Sensory: No sensory deficit.      Motor: No weakness.      Gait: Gait normal.      Deep Tendon Reflexes: Reflexes normal.   Psychiatric:         Mood and Affect: Mood normal.         Behavior: Behavior normal.         Judgment: Judgment normal.

## 2025-03-04 ENCOUNTER — OFFICE VISIT (OUTPATIENT)
Dept: PODIATRY | Facility: CLINIC | Age: 60
End: 2025-03-04
Payer: COMMERCIAL

## 2025-03-04 VITALS — BODY MASS INDEX: 29.89 KG/M2 | HEIGHT: 66 IN | WEIGHT: 186 LBS

## 2025-03-04 DIAGNOSIS — M19.072 OSTEOARTHRITIS OF LEFT ANKLE OR FOOT: Primary | ICD-10-CM

## 2025-03-04 DIAGNOSIS — G89.29 CHRONIC PAIN OF LEFT ANKLE: ICD-10-CM

## 2025-03-04 DIAGNOSIS — M25.572 CHRONIC PAIN OF LEFT ANKLE: ICD-10-CM

## 2025-03-04 PROCEDURE — 99213 OFFICE O/P EST LOW 20 MIN: CPT | Performed by: PODIATRIST

## 2025-03-04 NOTE — PROGRESS NOTES
:  Assessment & Plan  Osteoarthritis of left ankle or foot  Overall feeling much better and is no longer having any swelling.  Morning pain is substantially less and is approximately 3 out of 10 level now.  Recommend patient discontinue meloxicam and switch to OTC anti-inflammatories as needed.  If significant painful recurrence is noted we can inject again with corticosteroid as previously done.  Follow-up in approximately 3 months.       Chronic pain of left ankle  Degenerative arthritis of the subtalar joint.  As noted above           History of Present Illness     Fernando Zuniga is a 59 y.o. male   3/4/2025: 59-year-old male seen today for follow-up painful left rear foot due to arthritis of the subtalar joint.  Reports overall he is doing much better and estimates his pain is being 40% resolved.  Happy that there is no more swelling in the morning.    10/4/2024: 59-year-old male  seen today for follow-up evaluation of chronic rear foot arthritis.  Reports he is doing well with no recent exacerbations of pain and discomfort.  Anti-inflammatories seem to be adequately managing the chronic pain.  Reports he will be getting a new job as of January which will have less walking requirements on his feet.    7/12/2024: 59-year-old male seen today for follow-up painful left rear foot reporting a 50-60% improvement overall with last visits injection.  Reports that this in conjunction with less walking at work have helped dramatically with his overall pain and discomfort.    5/31/2024: 58-year-old male seen today for follow-up painful left ankle.  Reports pain and discomfort are no better overall with only a slight improvement with the meloxicam.    4/11/2024: 58-year-old male seen today for follow-up reports continued pain from his left ankle.  Reports anti-inflammatories did help but did not take them consistently.  Patient reports he took them when he felt he needed them for pain.  Physical therapy helped  "some but still has fairly consistent pain on a daily basis.    2/5/2024: 58-year-old male presents with chief complaint painful left ankle which he believes is related to an ankle fracture he sustained 15 to 20 years ago.  Now has pain every day.  Reports having ankle x-ray beginning of January with Ortho consult and then was referred to podiatry for continued care.      Review of Systems   Constitutional: Negative.    HENT: Negative.     Eyes: Negative.    Respiratory: Negative.     Cardiovascular: Negative.    Gastrointestinal: Negative.    Endocrine: Negative.    Genitourinary: Negative.    Musculoskeletal:  Positive for arthralgias.        Diminished range of motion left rear foot with less pain   Skin: Negative.    Allergic/Immunologic: Negative.    Neurological: Negative.    Hematological: Negative.    Psychiatric/Behavioral: Negative.       Objective   Ht 5' 6\" (1.676 m) Comment: stated  Wt 84.4 kg (186 lb)   BMI 30.02 kg/m²      Physical Exam  Vitals reviewed.   Constitutional:       General: He is not in acute distress.     Appearance: He is well-developed.   HENT:      Head: Normocephalic and atraumatic.      Nose: Nose normal.   Eyes:      Conjunctiva/sclera: Conjunctivae normal.   Cardiovascular:      Rate and Rhythm: Normal rate and regular rhythm.      Pulses: Normal pulses.   Pulmonary:      Effort: Pulmonary effort is normal.   Musculoskeletal:         General: Swelling and tenderness present.      Cervical back: Neck supple.      Left foot: Decreased range of motion.      Comments: Diminished range of motion left subtalar joint, diminished pain with palpation and range of motion.  Substantially less edema.  Only scant amount noted now.   Feet:      Right foot:      Protective Sensation: 10 sites tested.  10 sites sensed.      Left foot:      Protective Sensation: 10 sites tested.  10 sites sensed.   Skin:     General: Skin is warm and dry.      Capillary Refill: Capillary refill takes 2 to 3 " seconds.   Neurological:      General: No focal deficit present.      Mental Status: He is alert and oriented to person, place, and time.   Psychiatric:         Mood and Affect: Mood normal.

## 2025-04-29 ENCOUNTER — OFFICE VISIT (OUTPATIENT)
Dept: PODIATRY | Facility: CLINIC | Age: 60
End: 2025-04-29
Payer: COMMERCIAL

## 2025-04-29 VITALS — HEIGHT: 66 IN | BODY MASS INDEX: 30.05 KG/M2 | WEIGHT: 187 LBS

## 2025-04-29 DIAGNOSIS — M19.072 OSTEOARTHRITIS OF LEFT SUBTALAR JOINT: Primary | ICD-10-CM

## 2025-04-29 DIAGNOSIS — M25.572 CHRONIC PAIN OF LEFT ANKLE: ICD-10-CM

## 2025-04-29 DIAGNOSIS — G89.29 CHRONIC PAIN OF LEFT ANKLE: ICD-10-CM

## 2025-04-29 PROCEDURE — RECHECK: Performed by: PODIATRIST

## 2025-04-29 PROCEDURE — 20600 DRAIN/INJ JOINT/BURSA W/O US: CPT | Performed by: PODIATRIST

## 2025-05-07 RX ORDER — LIDOCAINE HYDROCHLORIDE 10 MG/ML
1 INJECTION, SOLUTION INFILTRATION; PERINEURAL ONCE
Status: SHIPPED | OUTPATIENT
Start: 2025-05-07

## 2025-05-07 RX ORDER — BUPIVACAINE HYDROCHLORIDE 5 MG/ML
1 INJECTION, SOLUTION PERINEURAL ONCE
Status: SHIPPED | OUTPATIENT
Start: 2025-05-07

## 2025-05-07 RX ORDER — TRIAMCINOLONE ACETONIDE 40 MG/ML
10 INJECTION, SUSPENSION INTRA-ARTICULAR; INTRAMUSCULAR ONCE
Status: SHIPPED | OUTPATIENT
Start: 2025-05-07

## 2025-05-07 RX ORDER — DEXAMETHASONE SODIUM PHOSPHATE 4 MG/ML
2 INJECTION, SOLUTION INTRA-ARTICULAR; INTRALESIONAL; INTRAMUSCULAR; INTRAVENOUS; SOFT TISSUE ONCE
Status: SHIPPED | OUTPATIENT
Start: 2025-05-07

## 2025-05-08 NOTE — PROGRESS NOTES
"Assessment/Plan:   Pain has recurred to previous level and patient requests another injection.  Injection with corticosteroid as noted below left STJ  Continue with meloxicam 15 mg daily with food.  Follow-up in approximately 6 weeks.       Diagnoses and all orders for this visit:    Osteoarthritis of left subtalar joint  -     Small joint arthrocentesis: L subtalar  -     dexamethasone (DECADRON) injection 2 mg  -     triamcinolone acetonide (KENALOG-40) 40 mg/mL injection 10 mg  -     lidocaine (XYLOCAINE) 1 % injection 1 mL  -     bupivacaine (MARCAINE) 0.5 % injection 1 mL    Chronic pain of left ankle        Small joint arthrocentesis: L subtalar    Performed by: Berny Tran DPM  Authorized by: Berny Tran DPM    Universal Protocol:  procedure performed by consultantConsent: Verbal consent obtained.  Risks and benefits: risks, benefits and alternatives were discussed  Consent given by: patient  Time out: Immediately prior to procedure a \"time out\" was called to verify the correct patient, procedure, equipment, support staff and site/side marked as required.  Patient understanding: patient states understanding of the procedure being performed  Patient identity confirmed: verbally with patient  Supporting Documentation  Indications: pain and joint swelling   Procedure Details  Location: foot - L subtalar  Preparation: Patient was prepped and draped in the usual sterile fashion  Needle size: 25 G  Ultrasound guidance: no  Approach: lateral    Patient tolerance: patient tolerated the procedure well with no immediate complications  Dressing:  Sterile dressing applied        Subjective:     Patient ID: Fernando Zuniga is a 59 y.o. male.    4/29/2025: 59-year-old male seen today for acute follow-up having recently been seen back in March.  Reports since then pain has increased to the point where it really is limiting his walking and he would like to have an injection like he did last spring.    3/4/2025: " 59-year-old male seen today for follow-up painful left rear foot due to arthritis of the subtalar joint.  Reports overall he is doing much better and estimates his pain is being 40% resolved.  Happy that there is no more swelling in the morning.             Review of Systems   Constitutional: Negative.    HENT: Negative.     Eyes: Negative.    Respiratory: Negative.     Cardiovascular: Negative.    Gastrointestinal: Negative.    Endocrine: Negative.    Genitourinary: Negative.    Musculoskeletal:  Positive for arthralgias.   Skin: Negative.    Allergic/Immunologic: Negative.    Neurological: Negative.    Hematological: Negative.    Psychiatric/Behavioral: Negative.           Objective:     Physical Exam  Constitutional:       Appearance: Normal appearance.   HENT:      Head: Normocephalic.      Right Ear: Tympanic membrane normal.      Left Ear: Tympanic membrane normal.      Nose: No congestion.      Mouth/Throat:      Pharynx: No oropharyngeal exudate or posterior oropharyngeal erythema.   Eyes:      Conjunctiva/sclera: Conjunctivae normal.      Pupils: Pupils are equal, round, and reactive to light.   Cardiovascular:      Rate and Rhythm: Normal rate and regular rhythm.      Pulses: Normal pulses.   Pulmonary:      Effort: Pulmonary effort is normal.   Musculoskeletal:         General: Tenderness present.      Left foot: Decreased range of motion.      Comments: Left rear foot diminished range of motion subtalar joint.  Moderate pain with palpation medial and lateral aspects of posterior facet subtalar joint.  Sinus tarsi region also quite tender to palpation.   Feet:      Right foot:      Protective Sensation: 10 sites tested.  10 sites sensed.      Left foot:      Protective Sensation: 10 sites tested.  10 sites sensed.   Skin:     General: Skin is warm and dry.      Capillary Refill: Capillary refill takes 2 to 3 seconds.      Coloration: Skin is not pale.      Findings: No bruising, erythema, lesion or rash.    Neurological:      General: No focal deficit present.      Mental Status: He is alert.      Cranial Nerves: No cranial nerve deficit.      Sensory: No sensory deficit.      Motor: No weakness.      Gait: Gait normal.      Deep Tendon Reflexes: Reflexes normal.   Psychiatric:         Mood and Affect: Mood normal.         Behavior: Behavior normal.         Judgment: Judgment normal.

## 2025-05-12 ENCOUNTER — OFFICE VISIT (OUTPATIENT)
Dept: FAMILY MEDICINE CLINIC | Facility: CLINIC | Age: 60
End: 2025-05-12
Payer: COMMERCIAL

## 2025-05-12 VITALS
HEIGHT: 66 IN | SYSTOLIC BLOOD PRESSURE: 138 MMHG | BODY MASS INDEX: 30.15 KG/M2 | OXYGEN SATURATION: 95 % | WEIGHT: 187.6 LBS | RESPIRATION RATE: 18 BRPM | DIASTOLIC BLOOD PRESSURE: 80 MMHG | HEART RATE: 64 BPM | TEMPERATURE: 97.6 F

## 2025-05-12 DIAGNOSIS — S29.011A MUSCLE STRAIN OF CHEST WALL, INITIAL ENCOUNTER: ICD-10-CM

## 2025-05-12 DIAGNOSIS — B35.1 ONYCHOMYCOSIS: ICD-10-CM

## 2025-05-12 DIAGNOSIS — R07.9 CHEST PAIN IN ADULT: Primary | ICD-10-CM

## 2025-05-12 PROCEDURE — 99214 OFFICE O/P EST MOD 30 MIN: CPT | Performed by: FAMILY MEDICINE

## 2025-05-12 PROCEDURE — 93000 ELECTROCARDIOGRAM COMPLETE: CPT | Performed by: FAMILY MEDICINE

## 2025-05-12 RX ORDER — TERBINAFINE HYDROCHLORIDE 250 MG/1
250 TABLET ORAL DAILY
Qty: 90 TABLET | Refills: 0 | Status: SHIPPED | OUTPATIENT
Start: 2025-05-12 | End: 2025-08-12

## 2025-05-12 NOTE — PROGRESS NOTES
Name: Fernando Zuniga      : 1965      MRN: [id number]  Encounter Provider: Bobo Benz MD  Encounter Date: 2025   Encounter department: [facility]    Assessment & Plan  Chest pain in adult    Orders:  •  POCT ECG    Muscle strain of chest wall, initial encounter         Onychomycosis    Orders:  •  terbinafine (LamISIL) 250 mg tablet; Take 1 tablet (250 mg total) by mouth daily       Patient Instructions   Reassured that his pain is not heart related but most likely related to muscle strain.  Okay to use meloxicam 15 mg once daily and 2 extra strength Tylenol up to 3 times a day.  For mycotic or fungus toenail, trial of terbinafine 250 mg once daily for 3 months.  Advised that it works about 50% of the time.  Return as scheduled.      History of Present Illness     HPI  Here with pain on the left side of the chest which started about 10 days ago.  Initially thought it was something he ate.  Does not remember any strenuous activity.  Pain is over the middle of the left breast.  Pain is not reproduced when he moves his left shoulder or presses on it.  Has taken Advil 2 or 3 times.  Presently, not having any discomfort.  Notes that his right great toenail is mycotic.  Inquires about treatment.    Review of Systems    Past Medical History:   Diagnosis Date   • Chronic pain of left ankle 01/10/2024   • Prediabetes 2023   • Renal colic 08/28/2013    x 3.  Most recent stone 2024     Past Surgical History:   Procedure Laterality Date   • [facility] NEUROPLASTY &/TRANSPOS MEDIAN NRV CARPAL TUNNE Left 2023    Procedure: CTR , LEFT;  Surgeon: Sd Orellana MD;  Location: [facility];  Service: Orthopedics     Social History     Social History Narrative     since 89.      Was a .  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    Wife worked as a registrar at [employer] .Quit  when her  "father became ill.     5 kids.     Oldest, Aaron 34, Working for [employer].  Lives in [place].     Addie 33, . Teacher. Grad of [employer]. Lives in [place]  2 daughters.  is a .    Alexia 31. Grad of [employer]. A. Lives in [place].    Fernando going to [employer] .    [employer] 12. 7TH grade.         Enjoys bike riding, camping, fishing, boating.        Father  at 74 from Covid in 3/20.     Current Outpatient Medications on File Prior to Visit   Medication Sig   • meloxicam (MOBIC) 15 mg tablet Take 1 tablet (15 mg total) by mouth daily as needed for moderate pain     No Known Allergies  Immunization History   Administered Date(s) Administered   • COVID-19 MODERNA VACC 0.5 ML IM 2021, 2021   • INFLUENZA 10/01/2019, 2020, 2021   • Influenza, recombinant, quadrivalent,injectable, preservative free 2021   • Tdap 2016     Objective   /80 (BP Location: Left arm, Patient Position: Sitting, Cuff Size: Large)   Pulse 64   Temp 97.6 °F (36.4 °C) (Tympanic)   Resp 18   Ht 5' 6\" (1.676 m)   Wt 85.1 kg (187 lb 9.6 oz)   SpO2 95%   BMI 30.28 kg/m²     Physical Exam  Appears well.  No rash present in the torso.  Lungs are clear.  Heart regular with no murmur.  EKG normal.        "

## 2025-05-12 NOTE — PATIENT INSTRUCTIONS
Reassured that his pain is not heart related but most likely related to muscle strain.  Okay to use meloxicam 15 mg once daily and 2 extra strength Tylenol up to 3 times a day.  For mycotic or fungus toenail, trial of terbinafine 250 mg once daily for 3 months.  Advised that it works about 50% of the time.  Return as scheduled.

## 2025-06-10 ENCOUNTER — OFFICE VISIT (OUTPATIENT)
Dept: PODIATRY | Facility: CLINIC | Age: 60
End: 2025-06-10
Payer: COMMERCIAL

## 2025-06-10 VITALS — HEIGHT: 66 IN | BODY MASS INDEX: 31.02 KG/M2 | WEIGHT: 193 LBS

## 2025-06-10 DIAGNOSIS — G89.29 CHRONIC PAIN OF LEFT ANKLE: ICD-10-CM

## 2025-06-10 DIAGNOSIS — M19.072 OSTEOARTHRITIS OF LEFT SUBTALAR JOINT: Primary | ICD-10-CM

## 2025-06-10 DIAGNOSIS — M25.572 CHRONIC PAIN OF LEFT ANKLE: ICD-10-CM

## 2025-06-10 PROCEDURE — 99213 OFFICE O/P EST LOW 20 MIN: CPT | Performed by: PODIATRIST

## 2025-06-10 NOTE — PROGRESS NOTES
:  Assessment & Plan  Osteoarthritis of left subtalar joint  Chronic pain of left ankle  Overall good progress since last visit's injection  Will discontinue meloxicam dosage to every other day for approximately 2 weeks and then discontinue altogether.  After that OTC NSAIDs as needed.    Continue to ice if pain starts to get bad or in the evening after a active day.  May consider another injection in the future but overall in the end an arthrodesis is probably the only long-term solution for the degenerative arthritis.    Continue with orthotics and good structurally supportive shoe gear.  Follow-up in 3 months       History of Present Illness     Fernando Zuniga is a 59 y.o. male   6/10/2025: 59-year-old male seen today for follow-up painful left ankle.  Reports injection he received last visit gave him a 60-70% improvement overall.  Pleased with progress continues to take meloxicam but reports he has not been consistent.  Denies any new pain/discomfort.    4/29/2025: 59-year-old male seen today for acute follow-up having recently been seen back in March.  Reports since then pain has increased to the point where it really is limiting his walking and he would like to have an injection like he did last spring.    3/4/2025: 59-year-old male seen today for follow-up painful left rear foot due to arthritis of the subtalar joint.  Reports overall he is doing much better and estimates his pain is being 40% resolved.  Happy that there is no more swelling in the morning.           Review of Systems   Constitutional: Negative.    HENT: Negative.     Eyes: Negative.    Respiratory: Negative.     Cardiovascular: Negative.    Gastrointestinal: Negative.    Endocrine: Negative.    Genitourinary: Negative.    Musculoskeletal:  Positive for arthralgias.        Left rear foot pain improving   Skin: Negative.    Allergic/Immunologic: Negative.    Neurological: Negative.    Hematological: Negative.    Psychiatric/Behavioral: Negative.  "      Objective   Ht 5' 6\" (1.676 m)   Wt 87.5 kg (193 lb)   BMI 31.15 kg/m²      Physical Exam  Vitals reviewed.   Constitutional:       General: He is not in acute distress.     Appearance: He is well-developed.   HENT:      Head: Normocephalic and atraumatic.      Nose: Nose normal.     Eyes:      Conjunctiva/sclera: Conjunctivae normal.       Cardiovascular:      Rate and Rhythm: Normal rate and regular rhythm.      Pulses: Normal pulses.   Pulmonary:      Effort: Pulmonary effort is normal.     Musculoskeletal:         General: Tenderness and deformity present.      Cervical back: Neck supple.      Comments: Mild tenderness with firm palpation STJ posterior facet lateral rear foot with diminished range of motion inversion/eversion noted.  Pain is slightly in inferior and posterior to the tip of the lateral malleolus.  Peroneal tendons are not tender with palpation.   Feet:      Right foot:      Protective Sensation: 10 sites tested.  10 sites sensed.      Left foot:      Protective Sensation: 10 sites tested.  10 sites sensed.     Skin:     General: Skin is warm and dry.      Capillary Refill: Capillary refill takes 2 to 3 seconds.     Neurological:      General: No focal deficit present.      Mental Status: He is alert and oriented to person, place, and time.     Psychiatric:         Mood and Affect: Mood normal.           "

## 2025-06-10 NOTE — ASSESSMENT & PLAN NOTE
Overall good progress since last visit's injection  Will discontinue meloxicam dosage to every other day for approximately 2 weeks and then discontinue altogether.  After that OTC NSAIDs as needed.    Continue to ice if pain starts to get bad or in the evening after a active day.  May consider another injection in the future but overall in the end an arthrodesis is probably the only long-term solution for the degenerative arthritis.    Continue with orthotics and good structurally supportive shoe gear.  Follow-up in 3 months

## 2025-08-07 DIAGNOSIS — B35.1 ONYCHOMYCOSIS: ICD-10-CM

## 2025-08-08 RX ORDER — TERBINAFINE HYDROCHLORIDE 250 MG/1
250 TABLET ORAL DAILY
Qty: 90 TABLET | Refills: 0 | OUTPATIENT
Start: 2025-08-08

## (undated) DEVICE — SYRINGE 10ML LL

## (undated) DEVICE — STERILE BETHLEHEM PLASTIC HAND: Brand: CARDINAL HEALTH

## (undated) DEVICE — GLOVE INDICATOR PI UNDERGLOVE SZ 8 BLUE

## (undated) DEVICE — NEEDLE HYPO 22G X 1-1/2 IN

## (undated) DEVICE — SUT ETHILON 4-0 PS-2 18 IN 1667H

## (undated) DEVICE — NEEDLE 18 G X 1 1/2

## (undated) DEVICE — GLOVE SRG BIOGEL 7.5

## (undated) DEVICE — GLOVE INDICATOR PI UNDERGLOVE SZ 7.5 BLUE

## (undated) DEVICE — INTENDED FOR TISSUE SEPARATION, AND OTHER PROCEDURES THAT REQUIRE A SHARP SURGICAL BLADE TO PUNCTURE OR CUT.: Brand: BARD-PARKER ® CARBON RIB-BACK BLADES